# Patient Record
Sex: FEMALE | Race: WHITE | NOT HISPANIC OR LATINO
[De-identification: names, ages, dates, MRNs, and addresses within clinical notes are randomized per-mention and may not be internally consistent; named-entity substitution may affect disease eponyms.]

---

## 2017-01-12 ENCOUNTER — TRANSCRIPTION ENCOUNTER (OUTPATIENT)
Age: 77
End: 2017-01-12

## 2018-08-23 PROBLEM — Z00.00 ENCOUNTER FOR PREVENTIVE HEALTH EXAMINATION: Status: ACTIVE | Noted: 2018-08-23

## 2018-08-29 ENCOUNTER — APPOINTMENT (OUTPATIENT)
Dept: OPHTHALMOLOGY | Facility: CLINIC | Age: 78
End: 2018-08-29

## 2019-08-13 ENCOUNTER — APPOINTMENT (OUTPATIENT)
Dept: ORTHOPEDIC SURGERY | Facility: CLINIC | Age: 79
End: 2019-08-13

## 2019-09-04 ENCOUNTER — APPOINTMENT (OUTPATIENT)
Dept: ORTHOPEDIC SURGERY | Facility: CLINIC | Age: 79
End: 2019-09-04
Payer: COMMERCIAL

## 2019-09-04 VITALS — WEIGHT: 105 LBS | HEIGHT: 63 IN | BODY MASS INDEX: 18.61 KG/M2

## 2019-09-04 DIAGNOSIS — Z86.79 PERSONAL HISTORY OF OTHER DISEASES OF THE CIRCULATORY SYSTEM: ICD-10-CM

## 2019-09-04 DIAGNOSIS — M25.511 PAIN IN RIGHT SHOULDER: ICD-10-CM

## 2019-09-04 PROCEDURE — 73030 X-RAY EXAM OF SHOULDER: CPT | Mod: RT

## 2019-09-04 PROCEDURE — 20552 NJX 1/MLT TRIGGER POINT 1/2: CPT

## 2019-09-04 PROCEDURE — 99214 OFFICE O/P EST MOD 30 MIN: CPT | Mod: 25

## 2019-09-04 RX ORDER — LOSARTAN POTASSIUM 100 MG/1
TABLET, FILM COATED ORAL
Refills: 0 | Status: ACTIVE | COMMUNITY

## 2019-09-04 RX ORDER — TRANYLCYPROMINE SULFATE 10 MG/1
TABLET, FILM COATED ORAL
Refills: 0 | Status: ACTIVE | COMMUNITY

## 2019-09-04 NOTE — HISTORY OF PRESENT ILLNESS
[2] : an average pain level of 2/10 [de-identified] : EIGHT SHOULDER\par 1 MONTH\par PAIN LEVEL 2/10\par CONSTANT\par DULL PAIN\par BETTER WITH ADVIL, TIGER BALM\par WORSE WITH COMPUTER USE\par

## 2019-09-04 NOTE — PROCEDURE
[de-identified] : TRIGGER POINT INJECTIONS\par \par Patient has demonstrated limited relief from NSAIDS, rest, exercises / PT, and after discussion of the risks and benefits, the patient elected to proceed with a trigger point injection into the \par RIGHT LEVATOR \par \par  I confirmed no prior adverse reactions, no active infections, and no relevant allergies. \par The skin was prepped in the usual sterile manner. The site was injected with local anesthetic followed by local needling. \par The injection was completed without complication and a bandage was applied.\par  \par The patient tolerated the procedure well and was given post-injection instructions. Cold Tx x 48 hours, analgesics. prn \par Medications: 1.5cc of 1% xylocaine + 1.5cc .25% Bupivicaine + KENALOG 1MG  per site\par

## 2019-09-04 NOTE — REVIEW OF SYSTEMS
[Joint Pain] : joint pain [Decrease Hearing] : decrease hearing [Skin Lesions] : skin lesions [Dizziness] : dizziness [Anxiety] : anxiety [Depression] : depression [Negative] : Endocrine

## 2019-12-18 ENCOUNTER — APPOINTMENT (OUTPATIENT)
Dept: ORTHOPEDIC SURGERY | Facility: CLINIC | Age: 79
End: 2019-12-18
Payer: COMMERCIAL

## 2019-12-18 VITALS — HEIGHT: 63 IN | BODY MASS INDEX: 18.61 KG/M2 | WEIGHT: 105 LBS

## 2019-12-18 PROCEDURE — 20552 NJX 1/MLT TRIGGER POINT 1/2: CPT

## 2019-12-18 PROCEDURE — 99213 OFFICE O/P EST LOW 20 MIN: CPT | Mod: 25

## 2019-12-18 NOTE — PHYSICAL EXAM
[de-identified] : PHYSICAL EXAM  RIGHT  SHOULDER\par \par SCAPULAR PROTRACTION\par AROM 140 / 140 / 90 / 30\par TENDER: LEVATOR SCAPULA\par \par SPECIAL TESTING :\par WASHINGTON - NEGATIVE\par CHANTELL - NEGATIVE \par SPEED TEST - NEGATIVE\par \par SIOMN - NEGATIVE \par APPREHENSION AND SUPPRESSION - NEGATIVE \par \par RC STRENGTH TESTING \par SS:  5/5\par SUB 5/5\par IS     5/5\par BICEPS  5/5\par \par SENSATION  - GROSSLY INTACT\par \par \par

## 2019-12-18 NOTE — PROCEDURE
[de-identified] : TRIGGER POINT INJECTIONS\par \par Patient has demonstrated limited relief from NSAIDS, rest, exercises / PT, and after discussion of the risks and benefits, the patient elected to proceed with a trigger point injection into the \par \par RIGHT LEVATOR AND TRAPEZIUS\par \par  I confirmed no prior adverse reactions, no active infections, and no relevant allergies. \par The skin was prepped in the usual sterile manner. The site was injected with local anesthetic followed by local needling. \par The injection was completed without complication and a bandage was applied.\par  \par The patient tolerated the procedure well and was given post-injection instructions. Cold Tx x 48 hours, analgesics. prn \par Medications: 1.5cc of 1% xylocaine + 1.5cc .25% Bupivicaine + KENALOG 1MG  per site\par

## 2019-12-18 NOTE — ASSESSMENT
[FreeTextEntry1] : POST INJECTION INSTRUCTIONS\par \par COLD THERAPY , ANALGESICS PRN\par \par HOME STRETCHING AND EXERCISES QD\par \par

## 2019-12-18 NOTE — HISTORY OF PRESENT ILLNESS
[de-identified] : RIGHT SHOULDER\par INTERMITTENT PAIN\par SLIGHT IMPROVEMENT \par TRIGGER POINT INJECTION 9/4/19-HELPFUL\par

## 2020-01-28 ENCOUNTER — APPOINTMENT (OUTPATIENT)
Dept: ORTHOPEDIC SURGERY | Facility: CLINIC | Age: 80
End: 2020-01-28

## 2020-02-05 ENCOUNTER — APPOINTMENT (OUTPATIENT)
Dept: ORTHOPEDIC SURGERY | Facility: CLINIC | Age: 80
End: 2020-02-05
Payer: COMMERCIAL

## 2020-02-05 VITALS
OXYGEN SATURATION: 97 % | HEART RATE: 85 BPM | SYSTOLIC BLOOD PRESSURE: 135 MMHG | BODY MASS INDEX: 18.61 KG/M2 | HEIGHT: 63 IN | DIASTOLIC BLOOD PRESSURE: 84 MMHG | WEIGHT: 105 LBS

## 2020-02-05 DIAGNOSIS — Z87.2 PERSONAL HISTORY OF DISEASES OF THE SKIN AND SUBCUTANEOUS TISSUE: ICD-10-CM

## 2020-02-05 DIAGNOSIS — L40.50 ARTHROPATHIC PSORIASIS, UNSPECIFIED: ICD-10-CM

## 2020-02-05 DIAGNOSIS — Z78.9 OTHER SPECIFIED HEALTH STATUS: ICD-10-CM

## 2020-02-05 DIAGNOSIS — Z82.61 FAMILY HISTORY OF ARTHRITIS: ICD-10-CM

## 2020-02-05 PROCEDURE — 99214 OFFICE O/P EST MOD 30 MIN: CPT

## 2020-02-05 PROCEDURE — 72170 X-RAY EXAM OF PELVIS: CPT

## 2020-02-05 PROCEDURE — 73564 X-RAY EXAM KNEE 4 OR MORE: CPT | Mod: 50

## 2020-02-05 NOTE — PHYSICAL EXAM
[de-identified] : General appearance: well nourished and hydrated, pleasant, alert and oriented x 3, cooperative.  Thin body habitus.\par HEENT: ecchymoses along bilateral zygomatic arches. EOM intact, nasal septum midline, oral cavity clear, external auditory canal clear.  \par Cardiovascular: no lower leg edema, no varicosities, dorsalis pedis pulses palpable and symmetric.  \par Lymphatics: no palpable lymphadenopathy, no lymphedema.  \par Neurologic: sensation is normal, no muscle weakness in upper or lower extremities, patella tendon reflexes present and symmetric.  \par Dermatologic: skin moist, warm, no rash.  \par Spine: cervical spine with normal lordosis and painless range of motion, thoracic spine with normal kyphosis and painless range of motion, lumbosacral spine with normal lordosis and painless range of motion.\par Gait: normal.  \par \par Left knee:\par - Inspection: small effusion and moderate circumferential soft tissue swelling, anterior ecchymoses (black/blue), negative erythema.  \par - Wounds: none.  \par - Alignment: normal.  \par - Palpation: tenderness on palpation throughout ecchymotic area.  \par - ROM active: 0-140, anterior pain on extreme flexion\par - Ligamentous laxity: negative Lachman, negative ant. drawer test, negative post. drawer test, negative pivot shift test, stable to varus stress, stable to valgus stress.  \par - Popliteal angle: 60 degrees\par - Muscle Test: 5/5 quad strength.  \par \par Right knee:\par - Inspection: negative swelling, ecchymosis, and erythema.  \par - Wounds: none.  \par - Alignment: normal.  \par - Palpation: no specific tenderness on palpation.  Nontender along lateral distal femur.\par - ROM active: 0-140, no pain on extremes of motion\par - Ligamentous laxity: negative Lachman, negative ant. drawer test, negative post. drawer test, negative pivot shift test, stable to varus stress, stable to valgus stress.  \par - Popliteal angle: 60 degrees\par - Muscle Test: 5/5 quad strength. [de-identified] : AP pelvis and 4 views of the bilateral knees (weightbearing AP, weightbearing Meade, weightbearing lateral, and Sunrise) were obtained today and interpreted by me.\par \par Bilateral hips demonstrate normal alignment without arthritis (Tonnis 0). There is no proximal femoral or acetabular deformity. There is no fracture or prior fracture deformity. There is no radiographic evidence of osteonecrosis.\par \par Bilateral sacroiliac joints appear normal without arthrosis.\par \par The left knee demonstrates normal alignment in the coronal and sagittal planes. Chondrocalcinosis is present. There is tricompartmental arthritis with relatively symmetric appearance through the medial and lateral compartments, Kellgren-Cam 3. The patella sits at appropriate height and tracks centrally.\par \par The right knee demonstrates normal alignment in the coronal and sagittal planes. Chondrocalcinosis is present. There is tricompartmental arthritis with relatively symmetric appearance through the medial and lateral compartments, Kellgren-Cam 2-3. The patella sits at appropriate height and tracks centrally. There is a radiodense bony lesion present at the posterolateral aspect of the distal femoral metaphysis. Appears non-aggressive.

## 2020-02-05 NOTE — HISTORY OF PRESENT ILLNESS
[de-identified] : 78y/o female presenting for left knee pain. She reports that symptoms first arose after a fall in September 2019. She is not able to articulate why she fell, though she denies blacking out and also denies a mechanical trip/twist. She fell several additional times in the following months, most recently about 2 weeks ago. In this most recent episode she hit  both her left knee and her face on the sidewalk. She is seeing a neurologist and workup is still ongoing to see why this has been going on. She does admit to feeling a little off-balance lately. She is an avid runner and is obsessed with returning to sport. However, running is really the only activity that exacerbates her knee pain. Pain is minimal when only standing and walking. She denies right knee problems. She does endorse a history of psoriatic arthritis for which she takes Humira. [Improving] : improving [4] : a current pain level of 4/10 [Rest] : relieved by rest [de-identified] : dull/achy [de-identified] : running

## 2020-02-05 NOTE — DISCUSSION/SUMMARY
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 recent knee contusion. She describes the pain as fairly minimal with normal activity (i.e. non-running) so I feel that the risk of an acute tibial plateau fracture or severe osteochondral injury is fairly low. I advised her to continue with relative rest from high-impact activities for at least 6 weeks while the fresh contusion resolves.\par - The falls are very concerning. While the neurology workup is ongoing, I recommended that she use a cane for an additional point of balance.\par - PT for quad strengthening, gait training\par - Encouraged gentle HEP\par - OTC NSAIDs and Tylenol as needed for pain\par - RTC 6 weeks for re-evaluation. Consider CSI if not adequately improved.\par - Cont monitoring asymptomatic right distal femur lesion with XR q3mo. MRI if any new symptoms

## 2020-02-11 ENCOUNTER — APPOINTMENT (OUTPATIENT)
Dept: ORTHOPEDIC SURGERY | Facility: CLINIC | Age: 80
End: 2020-02-11

## 2021-07-27 DIAGNOSIS — Z86.79 PERSONAL HISTORY OF OTHER DISEASES OF THE CIRCULATORY SYSTEM: ICD-10-CM

## 2021-07-27 RX ORDER — CARVEDILOL 3.12 MG/1
3.12 TABLET, FILM COATED ORAL
Refills: 0 | Status: ACTIVE | COMMUNITY

## 2021-07-27 RX ORDER — ADALIMUMAB 20MG/0.4ML
KIT SUBCUTANEOUS
Refills: 0 | Status: ACTIVE | COMMUNITY

## 2021-08-02 ENCOUNTER — APPOINTMENT (OUTPATIENT)
Dept: CARDIOTHORACIC SURGERY | Facility: CLINIC | Age: 81
End: 2021-08-02
Payer: COMMERCIAL

## 2021-08-02 VITALS
DIASTOLIC BLOOD PRESSURE: 71 MMHG | HEART RATE: 74 BPM | TEMPERATURE: 97.4 F | OXYGEN SATURATION: 95 % | WEIGHT: 105 LBS | RESPIRATION RATE: 17 BRPM | BODY MASS INDEX: 18.61 KG/M2 | SYSTOLIC BLOOD PRESSURE: 149 MMHG | HEIGHT: 63 IN

## 2021-08-02 PROCEDURE — 99203 OFFICE O/P NEW LOW 30 MIN: CPT

## 2021-08-05 NOTE — HISTORY OF PRESENT ILLNESS
[FreeTextEntry1] : 81 year old female with a history of Psoriatic Arthritis (currently on Methotrexate), hypertension, moderate to severe aortic regurgitation who was referred for further evaluation. \par \par The patient states she is feeling well with no complaints. She denies any SOB at rest or with exertion, chest pain, palpitations, dizziness, syncope, or LE edema.  She is active and walks/run three miles a day. \par \par ECHO 7/8/21 reveals mild to moderate moderate mitral regurgitation and moderate to severe aortic regurgitation   \par \par \par She lives in an apartment with her . Does not require any assistance with ADLs.  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

## 2021-08-05 NOTE — PHYSICAL EXAM
[General Appearance - In No Acute Distress] : in no acute distress [Jugular Venous Distention Increased] : there was no jugular-venous distention [Respiration, Rhythm And Depth] : normal respiratory rhythm and effort [Exaggerated Use Of Accessory Muscles For Inspiration] : no accessory muscle use [Heart Rate And Rhythm] : heart rate was normal and rhythm regular [Heart Sounds] : normal S1 and S2 [Bowel Sounds] : normal bowel sounds [Abdomen Soft] : soft [Abdomen Tenderness] : non-tender [No CVA Tenderness] : no ~M costovertebral angle tenderness [Abnormal Walk] : normal gait [] : no rash [Oriented To Time, Place, And Person] : oriented to person, place, and time [FreeTextEntry1] : + systolic ejection murmur

## 2021-11-15 ENCOUNTER — APPOINTMENT (OUTPATIENT)
Dept: ORTHOPEDIC SURGERY | Facility: CLINIC | Age: 81
End: 2021-11-15
Payer: COMMERCIAL

## 2021-11-15 DIAGNOSIS — G25.89 OTHER SPECIFIED EXTRAPYRAMIDAL AND MOVEMENT DISORDERS: ICD-10-CM

## 2021-11-15 DIAGNOSIS — S46.002A UNSPECIFIED INJURY OF MUSCLE(S) AND TENDON(S) OF THE ROTATOR CUFF OF LEFT SHOULDER, INITIAL ENCOUNTER: ICD-10-CM

## 2021-11-15 DIAGNOSIS — M75.42 IMPINGEMENT SYNDROME OF LEFT SHOULDER: ICD-10-CM

## 2021-11-15 PROCEDURE — 73030 X-RAY EXAM OF SHOULDER: CPT | Mod: LT

## 2021-11-15 PROCEDURE — 76882 US LMTD JT/FCL EVL NVASC XTR: CPT | Mod: LT,59

## 2021-11-15 PROCEDURE — 99214 OFFICE O/P EST MOD 30 MIN: CPT | Mod: 25

## 2021-11-15 NOTE — DISCUSSION/SUMMARY
[de-identified] : ULTRASOUND EVALUATION  REVEALS INFLAMMATORY CHANGES WITHOUT SIGNIFICANT TEAR \par PATIENT HAS ELECTED TO PROCEED WITH KENALOG INJECTION SHOULDER \par RISKS AND BENEFITS DISCUSSED - VERBAL CONSENT OBTAINED \par \par \par POST INJECTION INSTRUCTIONS\par \par COLD THERAPY , ANALGESICS PRN\par \par AVOID OVERHEAD WEIGHTS 6 WEIGHT \par \par HOME STRETCHING AND EXERCISES QD  HANDOUT PROVIDED, REVIEWED AND DEMONSTRATED \par \par START P.T.  2 WEEKS AFTER INJECTION - 2 X 4 WEEKS \par \par MRI IF NO RELIEF\par

## 2021-11-15 NOTE — PHYSICAL EXAM
[de-identified] : PHYSICAL EXAM LEFT  SHOULDER\par \par MARKIT  SCAPULAR PROTRACTION\par AROM 125 / 100 / 60 / 10 \par TENDER: SA REGION ANTERIOR AND LATERAL \par \par SPECIAL TESTING :\par [place] - POSITIVE \par CHANTELL - POSITIVE \par SPEED TEST - POSITIVE\par \par SIMON - NEGATIVE \par APPREHENSION AND SUPPRESSION - NEGATIVE \par \par RC STRENGTH TESTING \par SS:  5/5\par SUB 5/5\par IS     5/5\par BICEPS  5/5\par \par SENSATION  - GROSSLY INTACT\par \par \par  [de-identified] : LEFT SHOULDER XRAY (2 VIEWS - AP AND OUTLET) -  \par NO OBVIOUS FRACTURE , SEPARATION OR DISLOCATION \par NO SIGNIFICANT OSTEOARTHRITIS, \par TYPE 1B ACROMION \par CSA= 38\par

## 2021-11-15 NOTE — ASSESSMENT
[FreeTextEntry1] : EVALUATION AND MANAGEMENT \par \par 1- IMPINGEMENT SYNDROME  - NARROW SS OUTLET ON XRAY CONSISTENT WITH IMPINGEMENT SYNDROME \par PLAN -  HOME EXERCISES DEMONSTRATED AND PROVIDED, \par PROGRESS TO P.T. 2 X 4 WEEKS FOR SCAPULAR POSTURE, FLEXIBILITY AND REHAB \par \par 2 - ROTATOR CUFF TENDONITIS - TENDONITIS, BURSITIS, NO COMPLETE TEAR SEEN ON ULTRASOUND EVALUATION \par PLAN - KENALOG INJECTION  ( SEE PROCEDURE NOTE ) \par \par \par

## 2021-11-15 NOTE — PROCEDURE
[de-identified] : DIAGNOSTIC ULTRASOUND LEFT SHOULDER\par \par DIAGNOSTIC SONOGRAPHY of the Rotator Cuff Soft Tissue of the LEFT  SHOULDER was performed in Multiple Scan Planes with varying transducer frequencies.\par Imaging of the Supraspinatus Tendon reveals TENDONITIS, BURSITIS, ARTICULAR SIDE DEGENERATION  WITH OUT SIGNIFICANT / COMPLETE TEAR\par Imaging of the Biceps Tendon reveals no significant tear.\par Imaging of the Subscapularis Tendon reveals no significant tear.\par Imaging of the Infraspinatus Tendon reveals no significant tear.\par Key images were save digitally and reviewed with patient.\par \par \par INJECTION LEFT SHOULDER SA SPACE\par \par Patient has demonstrated limited relief from NSAIDS, rest, exercises / PT, and after discussion of the risks and benefits, the patient has elected to proceed with an ULTRASOUND GUIDED injection into the LEFT SUBACROMIAL  SPACE LATERAL APPROACH \par  \par Confirmed that the patient does not have history of prior adverse reactions, active, infections, or relevant allergies. There was no effusion, erythema, or warmth, and the skin was clear\par \par The skin was sterilized with alcohol. Ethyl Chloride was used as a topical anesthetic. Routine sterile technique. \par The site was injected UTILIZING ULTRASOUND GUIDANCE to confirm appropriate placement of the needle-\par with a mixture of medication and local anesthetic. The injection was completed without complication and a bandage was applied.\par  \par The patient tolerated the procedure well and was given post-injection instructions.Rec: Cold therapy, analgesics, avoid heavy activity.\par MEDICATION: 4cc of 1% xylocaine + 10mg of KENALOG\par \par

## 2021-11-15 NOTE — HISTORY OF PRESENT ILLNESS
[de-identified] : LEFT SHOULDER CHRONIC PAIN \par PAIN STARTED OCTOBER 2021-- PT WAS LIFTING 4-5 LB WEIGHTS DOING OVER HEAD LIFTING EXERCISES \par INTERMITTENT \par PAIN LEVEL: 0,10, 7/10 WITH MOVEMENT \par BETTER WITH IBUPROFEN, BENGAY\par WORSE WHEN REACHING BEHIND, LIFTING

## 2022-04-26 ENCOUNTER — TRANSCRIPTION ENCOUNTER (OUTPATIENT)
Age: 82
End: 2022-04-26

## 2022-12-19 ENCOUNTER — NON-APPOINTMENT (OUTPATIENT)
Age: 82
End: 2022-12-19

## 2023-03-13 ENCOUNTER — APPOINTMENT (OUTPATIENT)
Dept: PLASTIC SURGERY | Facility: CLINIC | Age: 83
End: 2023-03-13

## 2023-05-02 ENCOUNTER — NON-APPOINTMENT (OUTPATIENT)
Age: 83
End: 2023-05-02

## 2023-09-14 ENCOUNTER — NON-APPOINTMENT (OUTPATIENT)
Age: 83
End: 2023-09-14

## 2023-09-18 ENCOUNTER — APPOINTMENT (OUTPATIENT)
Dept: ORTHOPEDIC SURGERY | Facility: CLINIC | Age: 83
End: 2023-09-18
Payer: MEDICARE

## 2023-09-18 DIAGNOSIS — M25.511 PAIN IN RIGHT SHOULDER: ICD-10-CM

## 2023-09-18 PROCEDURE — 20553 NJX 1/MLT TRIGGER POINTS 3/>: CPT

## 2023-09-18 PROCEDURE — 99213 OFFICE O/P EST LOW 20 MIN: CPT | Mod: 25

## 2023-12-23 ENCOUNTER — INPATIENT (INPATIENT)
Facility: HOSPITAL | Age: 83
LOS: 3 days | Discharge: ANOTHER HOSPITAL | DRG: 862 | End: 2023-12-27
Attending: INTERNAL MEDICINE | Admitting: STUDENT IN AN ORGANIZED HEALTH CARE EDUCATION/TRAINING PROGRAM
Payer: MEDICARE

## 2023-12-23 VITALS
SYSTOLIC BLOOD PRESSURE: 144 MMHG | TEMPERATURE: 98 F | OXYGEN SATURATION: 98 % | DIASTOLIC BLOOD PRESSURE: 63 MMHG | HEART RATE: 91 BPM | WEIGHT: 104.94 LBS | HEIGHT: 62 IN | RESPIRATION RATE: 20 BRPM

## 2023-12-23 DIAGNOSIS — E87.1 HYPO-OSMOLALITY AND HYPONATREMIA: ICD-10-CM

## 2023-12-23 DIAGNOSIS — L03.90 CELLULITIS, UNSPECIFIED: ICD-10-CM

## 2023-12-23 DIAGNOSIS — M00.9 PYOGENIC ARTHRITIS, UNSPECIFIED: ICD-10-CM

## 2023-12-23 DIAGNOSIS — U07.1 COVID-19: ICD-10-CM

## 2023-12-23 DIAGNOSIS — Z29.9 ENCOUNTER FOR PROPHYLACTIC MEASURES, UNSPECIFIED: ICD-10-CM

## 2023-12-23 DIAGNOSIS — F32.9 MAJOR DEPRESSIVE DISORDER, SINGLE EPISODE, UNSPECIFIED: ICD-10-CM

## 2023-12-23 DIAGNOSIS — S42.402A UNSPECIFIED FRACTURE OF LOWER END OF LEFT HUMERUS, INITIAL ENCOUNTER FOR CLOSED FRACTURE: Chronic | ICD-10-CM

## 2023-12-23 DIAGNOSIS — D64.9 ANEMIA, UNSPECIFIED: ICD-10-CM

## 2023-12-23 DIAGNOSIS — I10 ESSENTIAL (PRIMARY) HYPERTENSION: ICD-10-CM

## 2023-12-23 LAB
ANION GAP SERPL CALC-SCNC: 7 MMOL/L — SIGNIFICANT CHANGE UP (ref 5–17)
ANION GAP SERPL CALC-SCNC: 7 MMOL/L — SIGNIFICANT CHANGE UP (ref 5–17)
BASOPHILS # BLD AUTO: 0.01 K/UL — SIGNIFICANT CHANGE UP (ref 0–0.2)
BASOPHILS # BLD AUTO: 0.01 K/UL — SIGNIFICANT CHANGE UP (ref 0–0.2)
BASOPHILS NFR BLD AUTO: 0.1 % — SIGNIFICANT CHANGE UP (ref 0–2)
BASOPHILS NFR BLD AUTO: 0.1 % — SIGNIFICANT CHANGE UP (ref 0–2)
BUN SERPL-MCNC: 19 MG/DL — SIGNIFICANT CHANGE UP (ref 7–23)
BUN SERPL-MCNC: 19 MG/DL — SIGNIFICANT CHANGE UP (ref 7–23)
CALCIUM SERPL-MCNC: 9.3 MG/DL — SIGNIFICANT CHANGE UP (ref 8.4–10.5)
CALCIUM SERPL-MCNC: 9.3 MG/DL — SIGNIFICANT CHANGE UP (ref 8.4–10.5)
CHLORIDE SERPL-SCNC: 95 MMOL/L — LOW (ref 96–108)
CHLORIDE SERPL-SCNC: 95 MMOL/L — LOW (ref 96–108)
CO2 SERPL-SCNC: 29 MMOL/L — SIGNIFICANT CHANGE UP (ref 22–31)
CO2 SERPL-SCNC: 29 MMOL/L — SIGNIFICANT CHANGE UP (ref 22–31)
CREAT SERPL-MCNC: 0.51 MG/DL — SIGNIFICANT CHANGE UP (ref 0.5–1.3)
CREAT SERPL-MCNC: 0.51 MG/DL — SIGNIFICANT CHANGE UP (ref 0.5–1.3)
CRP SERPL-MCNC: 116.2 MG/L — HIGH (ref 0–4)
CRP SERPL-MCNC: 116.2 MG/L — HIGH (ref 0–4)
EGFR: 93 ML/MIN/1.73M2 — SIGNIFICANT CHANGE UP
EGFR: 93 ML/MIN/1.73M2 — SIGNIFICANT CHANGE UP
EOSINOPHIL # BLD AUTO: 0.21 K/UL — SIGNIFICANT CHANGE UP (ref 0–0.5)
EOSINOPHIL # BLD AUTO: 0.21 K/UL — SIGNIFICANT CHANGE UP (ref 0–0.5)
EOSINOPHIL NFR BLD AUTO: 3 % — SIGNIFICANT CHANGE UP (ref 0–6)
EOSINOPHIL NFR BLD AUTO: 3 % — SIGNIFICANT CHANGE UP (ref 0–6)
ERYTHROCYTE [SEDIMENTATION RATE] IN BLOOD: 114 MM/HR — HIGH
ERYTHROCYTE [SEDIMENTATION RATE] IN BLOOD: 114 MM/HR — HIGH
GLUCOSE SERPL-MCNC: 122 MG/DL — HIGH (ref 70–99)
GLUCOSE SERPL-MCNC: 122 MG/DL — HIGH (ref 70–99)
HCT VFR BLD CALC: 28.4 % — LOW (ref 34.5–45)
HCT VFR BLD CALC: 28.4 % — LOW (ref 34.5–45)
HGB BLD-MCNC: 9.1 G/DL — LOW (ref 11.5–15.5)
HGB BLD-MCNC: 9.1 G/DL — LOW (ref 11.5–15.5)
IMM GRANULOCYTES NFR BLD AUTO: 0.4 % — SIGNIFICANT CHANGE UP (ref 0–0.9)
IMM GRANULOCYTES NFR BLD AUTO: 0.4 % — SIGNIFICANT CHANGE UP (ref 0–0.9)
LYMPHOCYTES # BLD AUTO: 1.09 K/UL — SIGNIFICANT CHANGE UP (ref 1–3.3)
LYMPHOCYTES # BLD AUTO: 1.09 K/UL — SIGNIFICANT CHANGE UP (ref 1–3.3)
LYMPHOCYTES # BLD AUTO: 15.4 % — SIGNIFICANT CHANGE UP (ref 13–44)
LYMPHOCYTES # BLD AUTO: 15.4 % — SIGNIFICANT CHANGE UP (ref 13–44)
MCHC RBC-ENTMCNC: 29.7 PG — SIGNIFICANT CHANGE UP (ref 27–34)
MCHC RBC-ENTMCNC: 29.7 PG — SIGNIFICANT CHANGE UP (ref 27–34)
MCHC RBC-ENTMCNC: 32 GM/DL — SIGNIFICANT CHANGE UP (ref 32–36)
MCHC RBC-ENTMCNC: 32 GM/DL — SIGNIFICANT CHANGE UP (ref 32–36)
MCV RBC AUTO: 92.8 FL — SIGNIFICANT CHANGE UP (ref 80–100)
MCV RBC AUTO: 92.8 FL — SIGNIFICANT CHANGE UP (ref 80–100)
MONOCYTES # BLD AUTO: 0.65 K/UL — SIGNIFICANT CHANGE UP (ref 0–0.9)
MONOCYTES # BLD AUTO: 0.65 K/UL — SIGNIFICANT CHANGE UP (ref 0–0.9)
MONOCYTES NFR BLD AUTO: 9.2 % — SIGNIFICANT CHANGE UP (ref 2–14)
MONOCYTES NFR BLD AUTO: 9.2 % — SIGNIFICANT CHANGE UP (ref 2–14)
NEUTROPHILS # BLD AUTO: 5.09 K/UL — SIGNIFICANT CHANGE UP (ref 1.8–7.4)
NEUTROPHILS # BLD AUTO: 5.09 K/UL — SIGNIFICANT CHANGE UP (ref 1.8–7.4)
NEUTROPHILS NFR BLD AUTO: 71.9 % — SIGNIFICANT CHANGE UP (ref 43–77)
NEUTROPHILS NFR BLD AUTO: 71.9 % — SIGNIFICANT CHANGE UP (ref 43–77)
NRBC # BLD: 0 /100 WBCS — SIGNIFICANT CHANGE UP (ref 0–0)
NRBC # BLD: 0 /100 WBCS — SIGNIFICANT CHANGE UP (ref 0–0)
PLATELET # BLD AUTO: 303 K/UL — SIGNIFICANT CHANGE UP (ref 150–400)
PLATELET # BLD AUTO: 303 K/UL — SIGNIFICANT CHANGE UP (ref 150–400)
POTASSIUM SERPL-MCNC: 4.7 MMOL/L — SIGNIFICANT CHANGE UP (ref 3.5–5.3)
POTASSIUM SERPL-MCNC: 4.7 MMOL/L — SIGNIFICANT CHANGE UP (ref 3.5–5.3)
POTASSIUM SERPL-SCNC: 4.7 MMOL/L — SIGNIFICANT CHANGE UP (ref 3.5–5.3)
POTASSIUM SERPL-SCNC: 4.7 MMOL/L — SIGNIFICANT CHANGE UP (ref 3.5–5.3)
RBC # BLD: 3.06 M/UL — LOW (ref 3.8–5.2)
RBC # BLD: 3.06 M/UL — LOW (ref 3.8–5.2)
RBC # FLD: 14.6 % — HIGH (ref 10.3–14.5)
RBC # FLD: 14.6 % — HIGH (ref 10.3–14.5)
SARS-COV-2 RNA SPEC QL NAA+PROBE: DETECTED
SARS-COV-2 RNA SPEC QL NAA+PROBE: DETECTED
SODIUM SERPL-SCNC: 131 MMOL/L — LOW (ref 135–145)
SODIUM SERPL-SCNC: 131 MMOL/L — LOW (ref 135–145)
WBC # BLD: 7.08 K/UL — SIGNIFICANT CHANGE UP (ref 3.8–10.5)
WBC # BLD: 7.08 K/UL — SIGNIFICANT CHANGE UP (ref 3.8–10.5)
WBC # FLD AUTO: 7.08 K/UL — SIGNIFICANT CHANGE UP (ref 3.8–10.5)
WBC # FLD AUTO: 7.08 K/UL — SIGNIFICANT CHANGE UP (ref 3.8–10.5)

## 2023-12-23 PROCEDURE — 73070 X-RAY EXAM OF ELBOW: CPT | Mod: 26,LT

## 2023-12-23 PROCEDURE — 99285 EMERGENCY DEPT VISIT HI MDM: CPT

## 2023-12-23 PROCEDURE — 99223 1ST HOSP IP/OBS HIGH 75: CPT | Mod: GC

## 2023-12-23 RX ORDER — LANOLIN ALCOHOL/MO/W.PET/CERES
3 CREAM (GRAM) TOPICAL AT BEDTIME
Refills: 0 | Status: DISCONTINUED | OUTPATIENT
Start: 2023-12-23 | End: 2023-12-27

## 2023-12-23 RX ORDER — ENOXAPARIN SODIUM 100 MG/ML
30 INJECTION SUBCUTANEOUS EVERY 24 HOURS
Refills: 0 | Status: DISCONTINUED | OUTPATIENT
Start: 2023-12-23 | End: 2023-12-27

## 2023-12-23 RX ORDER — CEFTRIAXONE 500 MG/1
1000 INJECTION, POWDER, FOR SOLUTION INTRAMUSCULAR; INTRAVENOUS EVERY 24 HOURS
Refills: 0 | Status: DISCONTINUED | OUTPATIENT
Start: 2023-12-23 | End: 2023-12-26

## 2023-12-23 RX ORDER — LOSARTAN POTASSIUM 100 MG/1
25 TABLET, FILM COATED ORAL DAILY
Refills: 0 | Status: DISCONTINUED | OUTPATIENT
Start: 2023-12-23 | End: 2023-12-27

## 2023-12-23 RX ORDER — VANCOMYCIN HCL 1 G
750 VIAL (EA) INTRAVENOUS ONCE
Refills: 0 | Status: DISCONTINUED | OUTPATIENT
Start: 2023-12-24 | End: 2023-12-24

## 2023-12-23 RX ORDER — ENOXAPARIN SODIUM 100 MG/ML
40 INJECTION SUBCUTANEOUS EVERY 24 HOURS
Refills: 0 | Status: DISCONTINUED | OUTPATIENT
Start: 2023-12-23 | End: 2023-12-23

## 2023-12-23 RX ORDER — VANCOMYCIN HCL 1 G
1000 VIAL (EA) INTRAVENOUS ONCE
Refills: 0 | Status: COMPLETED | OUTPATIENT
Start: 2023-12-23 | End: 2023-12-23

## 2023-12-23 RX ORDER — ACETAMINOPHEN 500 MG
650 TABLET ORAL EVERY 6 HOURS
Refills: 0 | Status: DISCONTINUED | OUTPATIENT
Start: 2023-12-23 | End: 2023-12-27

## 2023-12-23 RX ADMIN — ENOXAPARIN SODIUM 30 MILLIGRAM(S): 100 INJECTION SUBCUTANEOUS at 18:31

## 2023-12-23 RX ADMIN — CEFTRIAXONE 100 MILLIGRAM(S): 500 INJECTION, POWDER, FOR SOLUTION INTRAMUSCULAR; INTRAVENOUS at 19:41

## 2023-12-23 RX ADMIN — Medication 1000 MILLIGRAM(S): at 14:53

## 2023-12-23 RX ADMIN — Medication 250 MILLIGRAM(S): at 13:53

## 2023-12-23 NOTE — ED PROVIDER NOTE - NS ED ROS FT
REVIEW OF SYSTEMS  positive for: rash   negative for: fever, headache, eye pain, runny nose, sore throat, cough, shortness of breath, chest pain, stomach pain, vomiting, diarrhea, dysuria, myalgias,

## 2023-12-23 NOTE — H&P ADULT - PROBLEM SELECTOR PLAN 7
Home med [id number]     - med rec am Home med [id number]     - med rec am Home med [id number] five times a day    - unavailable in patient pharmacy, will ask patient family member to bring in Home med [id number] five times a day    - unavailable in patient pharmacy, will ask patient family member to bring in

## 2023-12-23 NOTE — H&P ADULT - ASSESSMENT
83 year old female with a past medical history significant for HTN and reactive arthritis, left elbow fracture s/p ORIF (Dr. Daly) on 12/7/23 who presented with pain of the left elbow for the past month and admitted for cellulitis. 83 year old female with a past medical history significant for HTN and psoriatic arthritis, left elbow fracture s/p ORIF (Dr. Daly) on 12/7/23 who presented with pain of the left elbow for the past month and admitted for cellulitis.

## 2023-12-23 NOTE — H&P ADULT - NSHPLABSRESULTS_GEN_ALL_CORE
LABS:                         9.1    7.08  )-----------( 303      ( 23 Dec 2023 12:14 )             28.4     12-23    131<L>  |  95<L>  |  19  ----------------------------<  122<H>  4.7   |  29  |  0.51    Ca    9.3      23 Dec 2023 12:14        Urinalysis Basic - ( 23 Dec 2023 12:14 )    Color: x / Appearance: x / SG: x / pH: x  Gluc: 122 mg/dL / Ketone: x  / Bili: x / Urobili: x   Blood: x / Protein: x / Nitrite: x   Leuk Esterase: x / RBC: x / WBC x   Sq Epi: x / Non Sq Epi: x / Bacteria: x                RADIOLOGY, EKG & ADDITIONAL TESTS:

## 2023-12-23 NOTE — CONSULT NOTE ADULT - SUBJECTIVE AND OBJECTIVE BOX
Orthopaedic Surgery Consult Note    For Surgeon:    HPI:    83 year old female covid +  w/ left elbow fracture  ORIF (Dr. Daly) on 12/7/23 at [facility] w/ ssi for "several weeks"   Per patient completed  5 day course of Keflex, however the pain and redness continued.     Reports she was adivsed by her surgeons for two more days of Keflex from 12/21/23 to today but came into the ED since symptoms have not improve  Denies trauma or injury. Without any new weakness/n/t/p. No other MSK complaints.          PMHx -  HTN and psoriatic arthritis, depression,  PSx -  Left elbow ORIF  Social (-)Tobacco (-)EtOh (-)Elicit substance       Vital Signs Last 24 Hrs  T(C): 37.4 (23 Dec 2023 18:34), Max: 37.4 (23 Dec 2023 18:34)  T(F): 99.4 (23 Dec 2023 18:34), Max: 99.4 (23 Dec 2023 18:34)  HR: 73 (23 Dec 2023 18:34) (73 - 91)  BP: 127/74 (23 Dec 2023 18:34) (127/74 - 144/63)  BP(mean): --  RR: 16 (23 Dec 2023 18:34) (16 - 20)  SpO2: 97% (23 Dec 2023 18:34) (97% - 98%)    Parameters below as of 23 Dec 2023 18:34  Patient On (Oxygen Delivery Method): room air        Physical Exam:  AVSS     Gen: NAD, AOx3     Left?Upper Extremity?   Skin intact?no open injuries   +ttp, swelling, erythema over the?L??elbow  No drainage  Painless ROM of all other extremities. No shoulder, wrist, hand pain on injured side.    AIN/PIN/U/Med/R ?intact to motor   SILT M/R/U/Ax   Palpable radial and ulnar pulses   Brisk cap refill distally   Compartments soft and compressible                           9.1    7.08  )-----------( 303      ( 23 Dec 2023 12:14 )             28.4     12-23    131<L>  |  95<L>  |  19  ----------------------------<  122<H>  4.7   |  29  |  0.51    Ca    9.3      23 Dec 2023 12:14        Imaging: Large elbow joint with soft tissue swelling about the elbow. Findings are of an uncertain etiology. A discrete fracture line is not identified..    A/P: 83yFemale w/ L elbow redness swelling s/p ORIF L elbow fracture  -Low suspicion for septic joint arthritis at this time  Recommend abx  -Follow up with index surgeon.   -Will monitor for improvement  while inhouse    -Discussed with Dr. Way    Ortho Pager [phone]   Orthopaedic Surgery Consult Note    For Surgeon:    HPI:    83 year old female covid +  w/ left elbow fracture  ORIF (Dr. Daly) on 12/7/[facility] for "several weeks"   Per patient completed  5 day course of Keflex, however the pain and redness continued.     Reports she was adivsed by her surgeons for two more days of Keflex from 12/21/23 to today but came into the ED since symptoms have not improve  Denies trauma or injury. Without any new weakness/n/t/p. No other MSK complaints.          PMHx -  HTN and psoriatic arthritis, depression,  PSx -  Left elbow ORIF  Social (-)Tobacco (-)EtOh (-)Elicit substance       Vital Signs Last 24 Hrs  T(C): 37.4 (23 Dec 2023 18:34), Max: 37.4 (23 Dec 2023 18:34)  T(F): 99.4 (23 Dec 2023 18:34), Max: 99.4 (23 Dec 2023 18:34)  HR: 73 (23 Dec 2023 18:34) (73 - 91)  BP: 127/74 (23 Dec 2023 18:34) (127/74 - 144/63)  BP(mean): --  RR: 16 (23 Dec 2023 18:34) (16 - 20)  SpO2: 97% (23 Dec 2023 18:34) (97% - 98%)    Parameters below as of 23 Dec 2023 18:34  Patient On (Oxygen Delivery Method): room air        Physical Exam:  AVSS     Gen: NAD, AOx3     Left?Upper Extremity?   Skin intact?no open injuries   +ttp, swelling, erythema over the?L??elbow  No drainage  Painless ROM of all other extremities. No shoulder, wrist, hand pain on injured side.    AIN/PIN/U/Med/R ?intact to motor   SILT M/R/U/Ax   Palpable radial and ulnar pulses   Brisk cap refill distally   Compartments soft and compressible                           9.1    7.08  )-----------( 303      ( 23 Dec 2023 12:14 )             28.4     12-23    131<L>  |  95<L>  |  19  ----------------------------<  122<H>  4.7   |  29  |  0.51    Ca    9.3      23 Dec 2023 12:14        Imaging: Large elbow joint with soft tissue swelling about the elbow. Findings are of an uncertain etiology. A discrete fracture line is not identified..    A/P: 83yFemale w/ L elbow redness swelling s/p ORIF L elbow fracture  -Low suspicion for septic joint arthritis at this time  Recommend abx  -Follow up with index surgeon.   -Will monitor for improvement  while inhouse    -Discussed with Dr. Way    Ortho Pager [phone]   Orthopaedic Surgery Consult Note    For Surgeon:    HPI:    83 year old female covid +  w/ left elbow fracture  ORIF (Dr. Daly) on 12/7/23 at [facility] w/ ssi for "several weeks"   Per patient completed  5 day course of Keflex, however the pain and redness continued.     Reports she was adivsed by her surgeons for two more days of Keflex from 12/21/23 to today but came into the ED since symptoms have not improve  Denies trauma or injury. Without any new weakness/n/t/p. No other MSK complaints.          PMHx -  HTN and psoriatic arthritis, depression,  PSx -  Left elbow ORIF  Social (-)Tobacco (-)EtOh (-)Elicit substance       Vital Signs Last 24 Hrs  T(C): 37.4 (23 Dec 2023 18:34), Max: 37.4 (23 Dec 2023 18:34)  T(F): 99.4 (23 Dec 2023 18:34), Max: 99.4 (23 Dec 2023 18:34)  HR: 73 (23 Dec 2023 18:34) (73 - 91)  BP: 127/74 (23 Dec 2023 18:34) (127/74 - 144/63)  BP(mean): --  RR: 16 (23 Dec 2023 18:34) (16 - 20)  SpO2: 97% (23 Dec 2023 18:34) (97% - 98%)    Parameters below as of 23 Dec 2023 18:34  Patient On (Oxygen Delivery Method): room air        Physical Exam:  AVSS     Gen: NAD, AOx3     Left?Upper Extremity?   Skin intact?no open injuries   +ttp, swelling, erythema over the?L??elbow  No drainage  Painless ROM of all other extremities. No shoulder, wrist, hand pain on injured side.    AIN/PIN/U/Med/R ?intact to motor   SILT M/R/U/Ax   Palpable radial and ulnar pulses   Brisk cap refill distally   Compartments soft and compressible                           9.1    7.08  )-----------( 303      ( 23 Dec 2023 12:14 )             28.4     12-23    131<L>  |  95<L>  |  19  ----------------------------<  122<H>  4.7   |  29  |  0.51    Ca    9.3      23 Dec 2023 12:14        Imaging: Large elbow joint with soft tissue swelling about the elbow. Findings are of an uncertain etiology. A discrete fracture line is not identified..    A/P: 83yFemale w/ L elbow redness swelling s/p ORIF L elbow fracture  -Low suspicion for septic joint arthritis at this time  Recommend abx  -Follow up with index surgeon.   -Please page with any questions or concerns    -Discussed with Dr. Way    Ortho Pager [phone]   Orthopaedic Surgery Consult Note    For Surgeon:    HPI:    83 year old female covid +  w/ left elbow fracture  ORIF (Dr. Daly) on 12/7/23 at [facility] w/ ssi for "several weeks"   Per patient completed  5 day course of Keflex, however the pain and redness continued.     Reports she was adivsed by her surgeons for two more days of Keflex from 12/21/23 to today but came into the ED since symptoms have not improve  Denies trauma or injury. Without any new weakness/n/t/p. No other MSK complaints.          PMHx -  HTN and psoriatic arthritis, depression,  PSx -  Left elbow ORIF  Social (-)Tobacco (-)EtOh (-)Elicit substance       Vital Signs Last 24 Hrs  T(C): 37.4 (23 Dec 2023 18:34), Max: 37.4 (23 Dec 2023 18:34)  T(F): 99.4 (23 Dec 2023 18:34), Max: 99.4 (23 Dec 2023 18:34)  HR: 73 (23 Dec 2023 18:34) (73 - 91)  BP: 127/74 (23 Dec 2023 18:34) (127/74 - 144/63)  BP(mean): --  RR: 16 (23 Dec 2023 18:34) (16 - 20)  SpO2: 97% (23 Dec 2023 18:34) (97% - 98%)    Parameters below as of 23 Dec 2023 18:34  Patient On (Oxygen Delivery Method): room air        Physical Exam:  AVSS     Gen: NAD, AOx3     Left?Upper Extremity?   Skin intact?no open injuries   +ttp, swelling, erythema over the?L??elbow  No drainage  Painless ROM of all other extremities. No shoulder, wrist, hand pain on injured side.    AIN/PIN/U/Med/R ?intact to motor   SILT M/R/U/Ax   Palpable radial and ulnar pulses   Brisk cap refill distally   Compartments soft and compressible                           9.1    7.08  )-----------( 303      ( 23 Dec 2023 12:14 )             28.4     12-23    131<L>  |  95<L>  |  19  ----------------------------<  122<H>  4.7   |  29  |  0.51    Ca    9.3      23 Dec 2023 12:14        Imaging: Large elbow joint with soft tissue swelling about the elbow. Findings are of an uncertain etiology. A discrete fracture line is not identified..    A/P: 83yFemale w/ L elbow redness swelling s/p ORIF L elbow fracture  -Low suspicion for septic joint arthritis at this time  Recommend abx  -Follow up with index surgeon.   -Please page with any questions or concerns    -Discussed with Dr. Way    Ortho Pager [phone]   Orthopaedic Surgery Consult Note    For Surgeon:    HPI:    83 year old female covid +  w/ left elbow fracture  ORIF (Dr. Daly) on 12/7/23 at [facility] w/ ssi for "several weeks"   Per patient completed  5 day course of Keflex, however the pain and redness continued.     Reports she was adivsed by her surgeons for two more days of Keflex from 12/21/23 to today but came into the ED since symptoms have not improve  Denies trauma or injury. Without any new weakness/n/t/p. No other MSK complaints.          PMHx -  HTN and psoriatic arthritis, depression,  PSx -  Left elbow ORIF  Social (-)Tobacco (-)EtOh (-)Elicit substance       Vital Signs Last 24 Hrs  T(C): 37.4 (23 Dec 2023 18:34), Max: 37.4 (23 Dec 2023 18:34)  T(F): 99.4 (23 Dec 2023 18:34), Max: 99.4 (23 Dec 2023 18:34)  HR: 73 (23 Dec 2023 18:34) (73 - 91)  BP: 127/74 (23 Dec 2023 18:34) (127/74 - 144/63)  BP(mean): --  RR: 16 (23 Dec 2023 18:34) (16 - 20)  SpO2: 97% (23 Dec 2023 18:34) (97% - 98%)    Parameters below as of 23 Dec 2023 18:34  Patient On (Oxygen Delivery Method): room air        Physical Exam:  AVSS     Gen: NAD, AOx3     Left?Upper Extremity?   Skin intact?no open injuries   +ttp, swelling, erythema over the?L??elbow  No drainage  Painless ROM of all other extremities. No shoulder, wrist, hand pain on injured side.    AIN/PIN/U/Med/R ?intact to motor   SILT M/R/U/Ax   Palpable radial and ulnar pulses   Brisk cap refill distally   Compartments soft and compressible                           9.1    7.08  )-----------( 303      ( 23 Dec 2023 12:14 )             28.4     12-23    131<L>  |  95<L>  |  19  ----------------------------<  122<H>  4.7   |  29  |  0.51    Ca    9.3      23 Dec 2023 12:14        Imaging: Large elbow joint with soft tissue swelling about the elbow. Findings are of an uncertain etiology. A discrete fracture line is not identified..    A/P: 83yFemale w/ L elbow redness swelling s/p ORIF L elbow fracture  -Low suspicion for septic joint arthritis at this time  -Recommend abx  -Follow up with index surgeon Dr. Escobedo for possible transfer of care. Spoke with office and they can be reached at [phone]  -Please page with any questions or concerns    -Discussed with Dr. Way    Ortho Pager [phone]   Orthopaedic Surgery Consult Note    For Surgeon:    HPI:    83 year old female covid +  w/ left elbow fracture  ORIF (Dr. Daly) on 12/7/23 at [facility] w/ ssi for "several weeks"   Per patient completed  5 day course of Keflex, however the pain and redness continued.     Reports she was adivsed by her surgeons for two more days of Keflex from 12/21/23 to today but came into the ED since symptoms have not improve  Denies trauma or injury. Without any new weakness/n/t/p. No other MSK complaints.          PMHx -  HTN and psoriatic arthritis, depression,  PSx -  Left elbow ORIF  Social (-)Tobacco (-)EtOh (-)Elicit substance       Vital Signs Last 24 Hrs  T(C): 37.4 (23 Dec 2023 18:34), Max: 37.4 (23 Dec 2023 18:34)  T(F): 99.4 (23 Dec 2023 18:34), Max: 99.4 (23 Dec 2023 18:34)  HR: 73 (23 Dec 2023 18:34) (73 - 91)  BP: 127/74 (23 Dec 2023 18:34) (127/74 - 144/63)  BP(mean): --  RR: 16 (23 Dec 2023 18:34) (16 - 20)  SpO2: 97% (23 Dec 2023 18:34) (97% - 98%)    Parameters below as of 23 Dec 2023 18:34  Patient On (Oxygen Delivery Method): room air        Physical Exam:  AVSS     Gen: NAD, AOx3     Left?Upper Extremity?   Skin intact?no open injuries   +ttp, swelling, erythema over the?L??elbow  No drainage  Painless ROM of all other extremities. No shoulder, wrist, hand pain on injured side.    AIN/PIN/U/Med/R ?intact to motor   SILT M/R/U/Ax   Palpable radial and ulnar pulses   Brisk cap refill distally   Compartments soft and compressible                           9.1    7.08  )-----------( 303      ( 23 Dec 2023 12:14 )             28.4     12-23    131<L>  |  95<L>  |  19  ----------------------------<  122<H>  4.7   |  29  |  0.51    Ca    9.3      23 Dec 2023 12:14        Imaging: Large elbow joint with soft tissue swelling about the elbow. Findings are of an uncertain etiology. A discrete fracture line is not identified..    A/P: 83yFemale w/ L elbow redness swelling s/p ORIF L elbow fracture  -Low suspicion for septic joint arthritis at this time  -Recommend abx  -Follow up with index surgeon Dr. Escobedo for possible transfer of care. Spoke with office and they can be reached at [phone]  -Please page with any questions or concerns    -Discussed with Dr. Way    Ortho Pager [phone]   Orthopaedic Surgery Consult Note    For Surgeon:    HPI:    83 year old female covid +  w/ left elbow fracture  ORIF (Dr. Escobedo) on 12/7/23 at [facility] w/ ssi for "several weeks"   Per patient completed  5 day course of Keflex, however the pain and redness continued.     Reports she was adivsed by her surgeons for two more days of Keflex from 12/21/23 to today but came into the ED since symptoms have not improve  Denies trauma or injury. Without any new weakness/n/t/p. No other MSK complaints.          PMHx -  HTN and psoriatic arthritis, depression,  PSx -  Left elbow ORIF  Social (-)Tobacco (-)EtOh (-)Elicit substance       Vital Signs Last 24 Hrs  T(C): 37.4 (23 Dec 2023 18:34), Max: 37.4 (23 Dec 2023 18:34)  T(F): 99.4 (23 Dec 2023 18:34), Max: 99.4 (23 Dec 2023 18:34)  HR: 73 (23 Dec 2023 18:34) (73 - 91)  BP: 127/74 (23 Dec 2023 18:34) (127/74 - 144/63)  BP(mean): --  RR: 16 (23 Dec 2023 18:34) (16 - 20)  SpO2: 97% (23 Dec 2023 18:34) (97% - 98%)    Parameters below as of 23 Dec 2023 18:34  Patient On (Oxygen Delivery Method): room air        Physical Exam:  AVSS     Gen: NAD, AOx3     Left?Upper Extremity?   Skin intact?no open injuries   +ttp, swelling, erythema over the?L??elbow  No drainage  Painless ROM of all other extremities. No shoulder, wrist, hand pain on injured side.    AIN/PIN/U/Med/R ?intact to motor   SILT M/R/U/Ax   Palpable radial and ulnar pulses   Brisk cap refill distally   Compartments soft and compressible                           9.1    7.08  )-----------( 303      ( 23 Dec 2023 12:14 )             28.4     12-23    131<L>  |  95<L>  |  19  ----------------------------<  122<H>  4.7   |  29  |  0.51    Ca    9.3      23 Dec 2023 12:14        Imaging: Large elbow joint with soft tissue swelling about the elbow. Findings are of an uncertain etiology. A discrete fracture line is not identified..    A/P: 83yFemale w/ L elbow redness swelling s/p ORIF L elbow fracture  -Low suspicion for septic joint arthritis at this time  -Recommend abx  -Follow up with index surgeon Dr. Escobedo for possible transfer of care. Spoke with office and they can be reached at [phone]  -Please page with any questions or concerns    -Discussed with Dr. Way    Ortho Pager [phone]   Orthopaedic Surgery Consult Note    For Surgeon:    HPI:    83 year old female covid +  w/ left elbow fracture  ORIF (Dr. Escobedo) on 12/7/23 at [facility] w/ ssi for "several weeks"   Per patient completed  5 day course of Keflex, however the pain and redness continued.     Reports she was adivsed by her surgeons for two more days of Keflex from 12/21/23 to today but came into the ED since symptoms have not improve  Denies trauma or injury. Without any new weakness/n/t/p. No other MSK complaints.          PMHx -  HTN and psoriatic arthritis, depression,  PSx -  Left elbow ORIF  Social (-)Tobacco (-)EtOh (-)Elicit substance       Vital Signs Last 24 Hrs  T(C): 37.4 (23 Dec 2023 18:34), Max: 37.4 (23 Dec 2023 18:34)  T(F): 99.4 (23 Dec 2023 18:34), Max: 99.4 (23 Dec 2023 18:34)  HR: 73 (23 Dec 2023 18:34) (73 - 91)  BP: 127/74 (23 Dec 2023 18:34) (127/74 - 144/63)  BP(mean): --  RR: 16 (23 Dec 2023 18:34) (16 - 20)  SpO2: 97% (23 Dec 2023 18:34) (97% - 98%)    Parameters below as of 23 Dec 2023 18:34  Patient On (Oxygen Delivery Method): room air        Physical Exam:  AVSS     Gen: NAD, AOx3     Left?Upper Extremity?   Skin intact?no open injuries   +ttp, swelling, erythema over the?L??elbow  No drainage  Painless ROM of all other extremities. No shoulder, wrist, hand pain on injured side.    AIN/PIN/U/Med/R ?intact to motor   SILT M/R/U/Ax   Palpable radial and ulnar pulses   Brisk cap refill distally   Compartments soft and compressible                           9.1    7.08  )-----------( 303      ( 23 Dec 2023 12:14 )             28.4     12-23    131<L>  |  95<L>  |  19  ----------------------------<  122<H>  4.7   |  29  |  0.51    Ca    9.3      23 Dec 2023 12:14        Imaging: Large elbow joint with soft tissue swelling about the elbow. Findings are of an uncertain etiology. A discrete fracture line is not identified..    A/P: 83yFemale w/ L elbow redness swelling s/p ORIF L elbow fracture  -Low suspicion for septic joint arthritis at this time  -Recommend abx  -Follow up with index surgeon Dr. Escobedo for possible transfer of care. Spoke with office and they can be reached at [phone]  -Please page with any questions or concerns    -Discussed with Dr. Way    Ortho Pager [phone]

## 2023-12-23 NOTE — ED ADULT TRIAGE NOTE - CHIEF COMPLAINT QUOTE
Pt presents to ED c.o worsening L elbow pain. Pt just had elbow surgery for fracture this December. Denies any other complaints. Pt A&Ox4, conversive in full sentences and ambulatory. Pt also endorses COVID + on paxlovid last day tomorrow.  Denies cp, sob, fever or chills.

## 2023-12-23 NOTE — H&P ADULT - PROBLEM SELECTOR PLAN 2
PE concerning for Septic arthritis. Recent surgery at [facility] with Dr. Bedoya.    - ortho consulted, f/u recs  - no surgical interventions  - consider CT if worsening, and to transfer patient to [facility] under Dr. Hall's care PE concerning for Septic arthritis. [facility]. Bedoya.    - ortho consulted, f/u recs  - no surgical interventions  - consider CT if worsening, and to transfer [facility] to [facility] under Dr. Hall's care PE concerning for Septic arthritis. Recent surgery at [facility] with Dr. Bedoya.  , .2    - ortho consulted, f/u recs  - no surgical interventions  - consider CT if worsening, and to transfer patient to [facility] under Dr. Hall's care PE concerning for Septic arthritis. Recent surgery at [facility] with Dr. Bedoya.  , .2    - ortho consulted, f/u recs  - no surgical interventions  - consider CT if worsening, and to transfer patient to [facility] under Dr. Hall's care

## 2023-12-23 NOTE — ED PROVIDER NOTE - PROGRESS NOTE DETAILS
Case discussed with orthopedics, no urgent intervention at this time but pending final recommendations. S/p ortho evaluation with no acute intervention / tap / washout, agree with continuation of antibiotics, can re-consult inpatient as needed.  Will admit for IV antibiotics and serial exams.

## 2023-12-23 NOTE — H&P ADULT - PROBLEM SELECTOR PLAN 8
Plan:  F: none  E: replete K<4, Mg<2  N: dash  VTE Prophylaxis: lovenox  GI: none  C: Full Code  D: [facility] Plan:  F: none  E: replete K<4, Mg<2  N: dash  VTE Prophylaxis: lovenox  GI: none  C: Full Code  D: [facility]

## 2023-12-23 NOTE — H&P ADULT - PROBLEM SELECTOR PLAN 1
Cellulitis of the left upper extremitiy with drainange. No abscess, no streaking.  s/p vancomycin 1000mg x1    - start vancomycin 750mg q12  - vanc trough after 3 dose Midnight 12/25/23  - start ceftriaxone 1000mg q12 hours  - pain: tylenol q6 Cellulitis of the left upper extremitiy with drainange. No abscess, no streaking.  s/p vancomycin 1000mg x1    - start vancomycin 750mg q12  - vanc trough after 3 dose Midnight 12/25/23  - start ceftriaxone 1000mg q12 hours  - pain: tylenol q6 PRN Cellulitis of the left upper extremitiy with drainange. No abscess, no streaking.  s/p vancomycin 1000mg x1    - start vancomycin 750mg q12  - vanc trough after 3rd dose approximately 9AM 12/25/23  - start ceftriaxone 1000mg q12 hours  - pain: tylenol q6 PRN

## 2023-12-23 NOTE — H&P ADULT - HISTORY OF PRESENT ILLNESS
83 year old female with a past medical history significant for HTN and reactive arthritis, left elbow fracture s/p ORIF (Dr. Daly) on 12/7/23 who presented with pain of the left elbow for the past month. She states that after her surgery she completed a 5 day course of Keflex, however the pain and redness continued. She states that she had yellow drainage from the area. She stated that she received keflex for two more days from 12/21/23 to today and came into the ED due to worsening of the symptoms. She also states that she started to feel fatigued and tested positive for covid and completed three days of paxlovid. She denies any chest pain, shortness of breath, abdominal pain, diarrhea, or uri sxs.    In the ED:  Vitals: 98.4f, 91, 144/63, 20RR 98% ra  Labs: WBC 7.08, Hb 9.1, , Na 131, .2, Covid positive  XR left elbow: Large elbow joint with soft tissue swelling about the elbow. Findings are   of an uncertain etiology. A discrete fracture line is not identified.  Interventions: Oxycodone 5/325 x1 , vancomycin 1000mg x1,    83 year old female with a past medical history significant for HTN and reactive arthritis, left elbow fracture s/p ORIF (Dr. Daly) on 12/7/23 who presented with pain of the left elbow for the past month. She states that after her surgery she completed a 5 day course of Keflex, however the pain and redness continued. She states that she had yellow drainage from the area. She stated that she received keflex for two more days from 12/21/23 to today and came into the ED due to worsening of the symptoms. She also states that she started to feel fatigued and tested positive for covid and completed three days of paxlovid. She denies any chest pain, shortness of breath, abdominal pain, diarrhea, or uri sxs.    In the ED:  Vitals: 98.4f, 91, 144/63, 20RR 98% ra  Labs: WBC 7.08, Hb 9.1, , Na 131, .2, Covid positive  XR left elbow: Large elbow joint with soft tissue swelling about the elbow. Findings are   of an uncertain etiology. A discrete fracture line is not identified.  Interventions: Oxycodone 5/325 x1 , vancomycin 1000mg x1,   Consults: Ortho   83 year old female with a past medical history significant for HTN and psoriatic arthritis, depression, left elbow fracture s/p ORIF (Dr. Daly) on 12/7/23 who presented with pain of the left elbow for the past month. She states that after her surgery she completed a 5 day course of Keflex, however the pain and redness continued. She states that she had yellow drainage from the area. She stated that she received keflex for two more days from 12/21/23 to today and came into the ED due to worsening of the symptoms. She also states that she started to feel fatigued and tested positive for covid and completed three days of paxlovid. She denies any chest pain, shortness of breath, abdominal pain, diarrhea, or uri sxs.    In the ED:  Vitals: 98.4f, 91, 144/63, 20RR 98% ra  Labs: WBC 7.08, Hb 9.1, , Na 131, .2, Covid positive  XR left elbow: Large elbow joint with soft tissue swelling about the elbow. Findings are   of an uncertain etiology. A discrete fracture line is not identified.  Interventions: Oxycodone 5/325 x1 , vancomycin 1000mg x1,   Consults: Ortho

## 2023-12-23 NOTE — ED PROVIDER NOTE - OBJECTIVE STATEMENT
83F with HTN and hx reactive arthritis, recent covid on paxlovid, L elbow fracture s/p ?ORIF @ [facility] several weeks ago, with L elbow pain x 2-3 days and after communication with surgical team prescribed keflex (has been taking for 2 days), now with persistent L elbow pain and redness.

## 2023-12-23 NOTE — H&P ADULT - PROBLEM SELECTOR PLAN 5
Na at 131. Possibly iso of SIADH 2/2 pain    - f/u urine studies Na at 131. Possibly iso of SIADH 2/2 pain    - Monitor

## 2023-12-23 NOTE — H&P ADULT - PROBLEM SELECTOR PLAN 4
Hgb on admission , MCV normocytic. Currently no signs of active bleeding (no hematochezia, melena, hemoptysis, hematuria)  - f/u iron panel, LDH, haptoglobin, retic count  - trend CBC  - maintain active T&S  - transfuse if Hgb <7

## 2023-12-23 NOTE — H&P ADULT - NSHPPHYSICALEXAM_GEN_ALL_CORE
T(C): 36.8 (12-23-23 @ 15:31), Max: 36.9 (12-23-23 @ 11:33)  HR: 80 (12-23-23 @ 15:31) (80 - 91)  BP: 140/83 (12-23-23 @ 15:31) (140/83 - 144/63)  RR: 18 (12-23-23 @ 15:31) (18 - 20)  SpO2: 98% (12-23-23 @ 15:31) (98% - 98%)    General: NAD, laying in bed, speaking in full sentences  HEENT: head NC/AT, no conjunctival injection, EOMI, MMM  Neck: supple, no JVD  Cardio: RRR, +S1/S2, no M/R/G  Resp: lungs CTAB, no cough/wheezes/rales/rhonchi  Abdo: soft, NT, ND, +bowel sounds x4, no organomegaly or palpable mass    Extremities: WWP, no edema/cyanosis/clubbing   Vasc: 2+ radial and DP pulses b/l  Neuro: A&Ox3  Psych: speech non-pressured, thoughts goal-oriented  Skin: dry, intact, no visible jaundice   MSK: no joint swelling T(C): 36.8 (12-23-23 @ 15:31), Max: 36.9 (12-23-23 @ 11:33)  HR: 80 (12-23-23 @ 15:31) (80 - 91)  BP: 140/83 (12-23-23 @ 15:31) (140/83 - 144/63)  RR: 18 (12-23-23 @ 15:31) (18 - 20)  SpO2: 98% (12-23-23 @ 15:31) (98% - 98%)    General: NAD, laying in bed, speaking in full sentences  HEENT: bulbar conj non injected, mmm, oral pharynx nonerytematous, pupils 4mm bilaterally briskly reactive to light  Neck: supple  Cardio: RRR  Resp: lungs CTAB, no egophony  Abdo: soft, NT, ND  Extremities: left elbow with surgical scar along dorsal aspect, erythema surrounding the elbow, and extending 2/3s down the distal aspect of the upper extremity with edema, ROM limited secondary to pain and swelling  Vasc: 2+ radial  Neuro: A&Ox3  Psych: speech non-pressured  Skin: dry, intact, no visible jaundice

## 2023-12-23 NOTE — ED PROVIDER NOTE - PHYSICAL EXAMINATION
General: comfortable, resting in ED  HEENT: atraumatic, no eye erythema or discharge  Pulm: no cyanosis, no added work of breathing  Cardiac: extremities warm, intact peripheral pulses including 2+ L radial, good capillary refill L fingers  Neuro: alert, conversant, intact sensation radial/ulnar/median fields of L hand  Psych: neutral affect, cooperative  Msk: left elbow held in partial flexion with pain on small passive range of motion  Skin: left lateral elbow erythema (marked with skin pen today 12/23 at noon), dried drainage on dressing

## 2023-12-23 NOTE — ED ADULT NURSE NOTE - OBJECTIVE STATEMENT
Patient presents to ER aox3 speaking in full sentence c/o left elbow pain x2-3 days. Patient reports recent fracture of this elbow and currently on keflex. pmhx reactive arthritis, htn.

## 2023-12-23 NOTE — ED PROVIDER NOTE - CCCP TRG CHIEF CMPLNT
Reason for Consult: Management of  Hyperglycemia     Surgical Procedure and Date: s/p CABG x 1, MVR  5/25/18; GJ tube placement in IR on 6/7/18     HPI: Patient is a 68 y.o. 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 The etiology of his endocarditis remains unknown. He was admitted to the SICU pre-operatively for IABP placement 5/23/18. Underwent cardiac surgery 5/25/18.   No personal or family hx of DM  Lab Results   Component Value Date    HGBA1C 5.4 02/22/2018     Endocrine consulted for BG mgmt.            elbow pain/injury

## 2023-12-23 NOTE — H&P ADULT - PROBLEM SELECTOR PLAN 6
States that he takes losartan 25 qD    - continue with home med  - continue with pain management Homed med losartan 25 qD    - continue with home med  - continue with pain management

## 2023-12-23 NOTE — ED PROVIDER NOTE - CLINICAL SUMMARY MEDICAL DECISION MAKING FREE TEXT BOX
Given appearance of elbow, concern for postoperative infection.  Patient s/p keflex x 2 days without improvement.  Will escalate antibiotics to include MRSA coverage given recent operative intervention, obtain cultures/inflammatory markers, discuss case with orthopedics consult team for possible intervention and to r/o septic arthritis given pain on passive range of motion.  Will obtain imaging r/ recurrent fracture or dislocation.  No signs of distal neurovascular compromise on exam.

## 2023-12-23 NOTE — H&P ADULT - NSHPSOCIALHISTORY_GEN_ALL_CORE
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

## 2023-12-23 NOTE — H&P ADULT - PROBLEM SELECTOR PLAN 3
Covid positive 12/20/23. Received paxlovid. Exam benign, on RA    - continue to monitor Covid positive 12/20/23. Received paxlovid. Exam benign, on RA    - supportive measures
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
## 2023-12-24 LAB
ALBUMIN SERPL ELPH-MCNC: 3.1 G/DL — LOW (ref 3.3–5)
ALBUMIN SERPL ELPH-MCNC: 3.1 G/DL — LOW (ref 3.3–5)
ALP SERPL-CCNC: 444 U/L — HIGH (ref 40–120)
ALP SERPL-CCNC: 444 U/L — HIGH (ref 40–120)
ALT FLD-CCNC: 205 U/L — HIGH (ref 10–45)
ALT FLD-CCNC: 205 U/L — HIGH (ref 10–45)
ANION GAP SERPL CALC-SCNC: 7 MMOL/L — SIGNIFICANT CHANGE UP (ref 5–17)
ANION GAP SERPL CALC-SCNC: 7 MMOL/L — SIGNIFICANT CHANGE UP (ref 5–17)
AST SERPL-CCNC: 47 U/L — HIGH (ref 10–40)
AST SERPL-CCNC: 47 U/L — HIGH (ref 10–40)
BASOPHILS # BLD AUTO: 0.03 K/UL — SIGNIFICANT CHANGE UP (ref 0–0.2)
BASOPHILS # BLD AUTO: 0.03 K/UL — SIGNIFICANT CHANGE UP (ref 0–0.2)
BASOPHILS NFR BLD AUTO: 0.4 % — SIGNIFICANT CHANGE UP (ref 0–2)
BASOPHILS NFR BLD AUTO: 0.4 % — SIGNIFICANT CHANGE UP (ref 0–2)
BILIRUB SERPL-MCNC: 0.3 MG/DL — SIGNIFICANT CHANGE UP (ref 0.2–1.2)
BILIRUB SERPL-MCNC: 0.3 MG/DL — SIGNIFICANT CHANGE UP (ref 0.2–1.2)
BLD GP AB SCN SERPL QL: NEGATIVE — SIGNIFICANT CHANGE UP
BLD GP AB SCN SERPL QL: NEGATIVE — SIGNIFICANT CHANGE UP
BUN SERPL-MCNC: 18 MG/DL — SIGNIFICANT CHANGE UP (ref 7–23)
BUN SERPL-MCNC: 18 MG/DL — SIGNIFICANT CHANGE UP (ref 7–23)
CALCIUM SERPL-MCNC: 8.8 MG/DL — SIGNIFICANT CHANGE UP (ref 8.4–10.5)
CALCIUM SERPL-MCNC: 8.8 MG/DL — SIGNIFICANT CHANGE UP (ref 8.4–10.5)
CHLORIDE SERPL-SCNC: 99 MMOL/L — SIGNIFICANT CHANGE UP (ref 96–108)
CHLORIDE SERPL-SCNC: 99 MMOL/L — SIGNIFICANT CHANGE UP (ref 96–108)
CO2 SERPL-SCNC: 30 MMOL/L — SIGNIFICANT CHANGE UP (ref 22–31)
CO2 SERPL-SCNC: 30 MMOL/L — SIGNIFICANT CHANGE UP (ref 22–31)
CREAT SERPL-MCNC: 0.61 MG/DL — SIGNIFICANT CHANGE UP (ref 0.5–1.3)
CREAT SERPL-MCNC: 0.61 MG/DL — SIGNIFICANT CHANGE UP (ref 0.5–1.3)
EGFR: 89 ML/MIN/1.73M2 — SIGNIFICANT CHANGE UP
EGFR: 89 ML/MIN/1.73M2 — SIGNIFICANT CHANGE UP
EOSINOPHIL # BLD AUTO: 0.21 K/UL — SIGNIFICANT CHANGE UP (ref 0–0.5)
EOSINOPHIL # BLD AUTO: 0.21 K/UL — SIGNIFICANT CHANGE UP (ref 0–0.5)
EOSINOPHIL NFR BLD AUTO: 2.8 % — SIGNIFICANT CHANGE UP (ref 0–6)
EOSINOPHIL NFR BLD AUTO: 2.8 % — SIGNIFICANT CHANGE UP (ref 0–6)
FERRITIN SERPL-MCNC: 130 NG/ML — SIGNIFICANT CHANGE UP (ref 13–330)
FERRITIN SERPL-MCNC: 130 NG/ML — SIGNIFICANT CHANGE UP (ref 13–330)
GLUCOSE SERPL-MCNC: 114 MG/DL — HIGH (ref 70–99)
GLUCOSE SERPL-MCNC: 114 MG/DL — HIGH (ref 70–99)
HAPTOGLOB SERPL-MCNC: 260 MG/DL — HIGH (ref 34–200)
HAPTOGLOB SERPL-MCNC: 260 MG/DL — HIGH (ref 34–200)
HCT VFR BLD CALC: 26.3 % — LOW (ref 34.5–45)
HCT VFR BLD CALC: 26.3 % — LOW (ref 34.5–45)
HGB BLD-MCNC: 8.3 G/DL — LOW (ref 11.5–15.5)
HGB BLD-MCNC: 8.3 G/DL — LOW (ref 11.5–15.5)
IMM GRANULOCYTES NFR BLD AUTO: 0.9 % — SIGNIFICANT CHANGE UP (ref 0–0.9)
IMM GRANULOCYTES NFR BLD AUTO: 0.9 % — SIGNIFICANT CHANGE UP (ref 0–0.9)
IRON SATN MFR SERPL: 15 UG/DL — LOW (ref 30–160)
IRON SATN MFR SERPL: 15 UG/DL — LOW (ref 30–160)
IRON SATN MFR SERPL: 8 % — LOW (ref 14–50)
IRON SATN MFR SERPL: 8 % — LOW (ref 14–50)
LDH SERPL L TO P-CCNC: 166 U/L — SIGNIFICANT CHANGE UP (ref 50–242)
LDH SERPL L TO P-CCNC: 166 U/L — SIGNIFICANT CHANGE UP (ref 50–242)
LYMPHOCYTES # BLD AUTO: 1.72 K/UL — SIGNIFICANT CHANGE UP (ref 1–3.3)
LYMPHOCYTES # BLD AUTO: 1.72 K/UL — SIGNIFICANT CHANGE UP (ref 1–3.3)
LYMPHOCYTES # BLD AUTO: 22.5 % — SIGNIFICANT CHANGE UP (ref 13–44)
LYMPHOCYTES # BLD AUTO: 22.5 % — SIGNIFICANT CHANGE UP (ref 13–44)
MAGNESIUM SERPL-MCNC: 2 MG/DL — SIGNIFICANT CHANGE UP (ref 1.6–2.6)
MAGNESIUM SERPL-MCNC: 2 MG/DL — SIGNIFICANT CHANGE UP (ref 1.6–2.6)
MCHC RBC-ENTMCNC: 29.2 PG — SIGNIFICANT CHANGE UP (ref 27–34)
MCHC RBC-ENTMCNC: 29.2 PG — SIGNIFICANT CHANGE UP (ref 27–34)
MCHC RBC-ENTMCNC: 31.6 GM/DL — LOW (ref 32–36)
MCHC RBC-ENTMCNC: 31.6 GM/DL — LOW (ref 32–36)
MCV RBC AUTO: 92.6 FL — SIGNIFICANT CHANGE UP (ref 80–100)
MCV RBC AUTO: 92.6 FL — SIGNIFICANT CHANGE UP (ref 80–100)
MONOCYTES # BLD AUTO: 0.89 K/UL — SIGNIFICANT CHANGE UP (ref 0–0.9)
MONOCYTES # BLD AUTO: 0.89 K/UL — SIGNIFICANT CHANGE UP (ref 0–0.9)
MONOCYTES NFR BLD AUTO: 11.7 % — SIGNIFICANT CHANGE UP (ref 2–14)
MONOCYTES NFR BLD AUTO: 11.7 % — SIGNIFICANT CHANGE UP (ref 2–14)
NEUTROPHILS # BLD AUTO: 4.71 K/UL — SIGNIFICANT CHANGE UP (ref 1.8–7.4)
NEUTROPHILS # BLD AUTO: 4.71 K/UL — SIGNIFICANT CHANGE UP (ref 1.8–7.4)
NEUTROPHILS NFR BLD AUTO: 61.7 % — SIGNIFICANT CHANGE UP (ref 43–77)
NEUTROPHILS NFR BLD AUTO: 61.7 % — SIGNIFICANT CHANGE UP (ref 43–77)
NRBC # BLD: 0 /100 WBCS — SIGNIFICANT CHANGE UP (ref 0–0)
NRBC # BLD: 0 /100 WBCS — SIGNIFICANT CHANGE UP (ref 0–0)
PHOSPHATE SERPL-MCNC: 4.2 MG/DL — SIGNIFICANT CHANGE UP (ref 2.5–4.5)
PHOSPHATE SERPL-MCNC: 4.2 MG/DL — SIGNIFICANT CHANGE UP (ref 2.5–4.5)
PLATELET # BLD AUTO: 342 K/UL — SIGNIFICANT CHANGE UP (ref 150–400)
PLATELET # BLD AUTO: 342 K/UL — SIGNIFICANT CHANGE UP (ref 150–400)
POTASSIUM SERPL-MCNC: 4.1 MMOL/L — SIGNIFICANT CHANGE UP (ref 3.5–5.3)
POTASSIUM SERPL-MCNC: 4.1 MMOL/L — SIGNIFICANT CHANGE UP (ref 3.5–5.3)
POTASSIUM SERPL-SCNC: 4.1 MMOL/L — SIGNIFICANT CHANGE UP (ref 3.5–5.3)
POTASSIUM SERPL-SCNC: 4.1 MMOL/L — SIGNIFICANT CHANGE UP (ref 3.5–5.3)
PROT SERPL-MCNC: 6.1 G/DL — SIGNIFICANT CHANGE UP (ref 6–8.3)
PROT SERPL-MCNC: 6.1 G/DL — SIGNIFICANT CHANGE UP (ref 6–8.3)
RBC # BLD: 2.84 M/UL — LOW (ref 3.8–5.2)
RBC # FLD: 14.6 % — HIGH (ref 10.3–14.5)
RBC # FLD: 14.6 % — HIGH (ref 10.3–14.5)
RETICS #: 43.2 K/UL — SIGNIFICANT CHANGE UP (ref 25–125)
RETICS #: 43.2 K/UL — SIGNIFICANT CHANGE UP (ref 25–125)
RETICS/RBC NFR: 1.5 % — SIGNIFICANT CHANGE UP (ref 0.5–2.5)
RETICS/RBC NFR: 1.5 % — SIGNIFICANT CHANGE UP (ref 0.5–2.5)
RH IG SCN BLD-IMP: POSITIVE — SIGNIFICANT CHANGE UP
RH IG SCN BLD-IMP: POSITIVE — SIGNIFICANT CHANGE UP
SODIUM SERPL-SCNC: 136 MMOL/L — SIGNIFICANT CHANGE UP (ref 135–145)
SODIUM SERPL-SCNC: 136 MMOL/L — SIGNIFICANT CHANGE UP (ref 135–145)
TIBC SERPL-MCNC: 198 UG/DL — LOW (ref 220–430)
TIBC SERPL-MCNC: 198 UG/DL — LOW (ref 220–430)
UIBC SERPL-MCNC: 183 UG/DL — SIGNIFICANT CHANGE UP (ref 110–370)
UIBC SERPL-MCNC: 183 UG/DL — SIGNIFICANT CHANGE UP (ref 110–370)
WBC # BLD: 7.63 K/UL — SIGNIFICANT CHANGE UP (ref 3.8–10.5)
WBC # BLD: 7.63 K/UL — SIGNIFICANT CHANGE UP (ref 3.8–10.5)
WBC # FLD AUTO: 7.63 K/UL — SIGNIFICANT CHANGE UP (ref 3.8–10.5)
WBC # FLD AUTO: 7.63 K/UL — SIGNIFICANT CHANGE UP (ref 3.8–10.5)

## 2023-12-24 PROCEDURE — 76705 ECHO EXAM OF ABDOMEN: CPT | Mod: 26

## 2023-12-24 PROCEDURE — 99233 SBSQ HOSP IP/OBS HIGH 50: CPT

## 2023-12-24 RX ORDER — VANCOMYCIN HCL 1 G
750 VIAL (EA) INTRAVENOUS EVERY 24 HOURS
Refills: 0 | Status: COMPLETED | OUTPATIENT
Start: 2023-12-24 | End: 2023-12-25

## 2023-12-24 RX ORDER — TRANYLCYPROMINE SULFATE 10 MG/1
1 TABLET, FILM COATED ORAL
Refills: 0 | DISCHARGE

## 2023-12-24 RX ORDER — LOSARTAN POTASSIUM 100 MG/1
1 TABLET, FILM COATED ORAL
Refills: 0 | DISCHARGE

## 2023-12-24 RX ADMIN — Medication 250 MILLIGRAM(S): at 12:55

## 2023-12-24 RX ADMIN — ENOXAPARIN SODIUM 30 MILLIGRAM(S): 100 INJECTION SUBCUTANEOUS at 18:29

## 2023-12-24 RX ADMIN — LOSARTAN POTASSIUM 25 MILLIGRAM(S): 100 TABLET, FILM COATED ORAL at 05:59

## 2023-12-24 RX ADMIN — CEFTRIAXONE 100 MILLIGRAM(S): 500 INJECTION, POWDER, FOR SOLUTION INTRAMUSCULAR; INTRAVENOUS at 18:29

## 2023-12-24 NOTE — PATIENT PROFILE ADULT - FALL HARM RISK - UNIVERSAL INTERVENTIONS
Bed in lowest position, wheels locked, appropriate side rails in place/Call bell, personal items and telephone in reach/Instruct patient to call for assistance before getting out of bed or chair/Non-slip footwear when patient is out of bed/[place] to call system/Physically safe environment - no spills, clutter or unnecessary equipment/Purposeful Proactive Rounding/Room/bathroom lighting operational, light cord in reach Bed in lowest position, wheels locked, appropriate side rails in place/Call bell, personal items and telephone in reach/Instruct patient to call for assistance before getting out of bed or chair/Non-slip footwear when patient is out of bed/[place] to call system/Physically safe environment - no spills, clutter or unnecessary equipment/Purposeful Proactive Rounding/Room/bathroom lighting operational, light cord in reach

## 2023-12-24 NOTE — PROGRESS NOTE ADULT - SUBJECTIVE AND OBJECTIVE BOX
INTERVAL EVENTS: Continues to have L elbow pain     PAST MEDICAL & SURGICAL HISTORY:  Hypertension    Left elbow fracture        MEDICATIONS  (STANDING):  cefTRIAXone   IVPB 1000 milliGRAM(s) IV Intermittent every 24 hours  enoxaparin Injectable 30 milliGRAM(s) SubCutaneous every 24 hours  losartan 25 milliGRAM(s) Oral daily  vancomycin  IVPB 750 milliGRAM(s) IV Intermittent every 24 hours    MEDICATIONS  (PRN):  acetaminophen     Tablet .. 650 milliGRAM(s) Oral every 6 hours PRN Temp greater or equal to 38C (100.4F), Mild Pain (1 - 3)  melatonin 3 milliGRAM(s) Oral at bedtime PRN Insomnia    Vital Signs Last 24 Hrs  T(C): 36.7 (24 Dec 2023 08:38), Max: 37.4 (23 Dec 2023 18:34)  T(F): 98.1 (24 Dec 2023 08:38), Max: 99.4 (23 Dec 2023 18:34)  HR: 90 (24 Dec 2023 08:38) (73 - 94)  BP: 112/75 (24 Dec 2023 08:38) (112/75 - 140/83)  BP(mean): --  RR: 18 (24 Dec 2023 08:38) (16 - 18)  SpO2: 93% (24 Dec 2023 08:38) (93% - 98%)    Parameters below as of 24 Dec 2023 08:38  Patient On (Oxygen Delivery Method): room air        PHYSICAL EXAM:  GEN: Awake, alert. NAD.   HEENT: NCAT, PERRL, EOMI. Mucosa moist. No JVD.  RESP: CTA b/l  CV: RRR. Normal S1/S2. No m/r/g.  ABD: Soft. NT/ND. BS+  EXT: Warm. Left UE with pain on palpation. ROM limited by pain. Mild erythema within previously demarcated borders. No fluctuance but edema is present     LABS:                        8.3    7.63  )-----------( 342      ( 24 Dec 2023 07:06 )             26.3     12-24    136  |  99  |  18  ----------------------------<  114<H>  4.1   |  30  |  0.61    Ca    8.8      24 Dec 2023 07:06  Phos  4.2     12-24  Mg     2.0     12-24    TPro  6.1  /  Alb  3.1<L>  /  TBili  0.3  /  DBili  x   /  AST  47<H>  /  ALT  205<H>  /  AlkPhos  444<H>  12-24          Urinalysis Basic - ( 24 Dec 2023 07:06 )    Color: x / Appearance: x / SG: x / pH: x  Gluc: 114 mg/dL / Ketone: x  / Bili: x / Urobili: x   Blood: x / Protein: x / Nitrite: x   Leuk Esterase: x / RBC: x / WBC x   Sq Epi: x / Non Sq Epi: x / Bacteria: x      I&O's Summary
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
## 2023-12-25 DIAGNOSIS — R74.8 ABNORMAL LEVELS OF OTHER SERUM ENZYMES: ICD-10-CM

## 2023-12-25 DIAGNOSIS — M86.9 OSTEOMYELITIS, UNSPECIFIED: ICD-10-CM

## 2023-12-25 LAB
ALBUMIN SERPL ELPH-MCNC: 2.7 G/DL — LOW (ref 3.3–5)
ALBUMIN SERPL ELPH-MCNC: 2.7 G/DL — LOW (ref 3.3–5)
ALP SERPL-CCNC: 413 U/L — HIGH (ref 40–120)
ALP SERPL-CCNC: 413 U/L — HIGH (ref 40–120)
ALT FLD-CCNC: 151 U/L — HIGH (ref 10–45)
ALT FLD-CCNC: 151 U/L — HIGH (ref 10–45)
ANION GAP SERPL CALC-SCNC: 10 MMOL/L — SIGNIFICANT CHANGE UP (ref 5–17)
ANION GAP SERPL CALC-SCNC: 10 MMOL/L — SIGNIFICANT CHANGE UP (ref 5–17)
AST SERPL-CCNC: 35 U/L — SIGNIFICANT CHANGE UP (ref 10–40)
AST SERPL-CCNC: 35 U/L — SIGNIFICANT CHANGE UP (ref 10–40)
BASOPHILS # BLD AUTO: 0.03 K/UL — SIGNIFICANT CHANGE UP (ref 0–0.2)
BASOPHILS # BLD AUTO: 0.03 K/UL — SIGNIFICANT CHANGE UP (ref 0–0.2)
BASOPHILS NFR BLD AUTO: 0.4 % — SIGNIFICANT CHANGE UP (ref 0–2)
BASOPHILS NFR BLD AUTO: 0.4 % — SIGNIFICANT CHANGE UP (ref 0–2)
BILIRUB SERPL-MCNC: 0.3 MG/DL — SIGNIFICANT CHANGE UP (ref 0.2–1.2)
BILIRUB SERPL-MCNC: 0.3 MG/DL — SIGNIFICANT CHANGE UP (ref 0.2–1.2)
BUN SERPL-MCNC: 24 MG/DL — HIGH (ref 7–23)
BUN SERPL-MCNC: 24 MG/DL — HIGH (ref 7–23)
CALCIUM SERPL-MCNC: 8.9 MG/DL — SIGNIFICANT CHANGE UP (ref 8.4–10.5)
CALCIUM SERPL-MCNC: 8.9 MG/DL — SIGNIFICANT CHANGE UP (ref 8.4–10.5)
CHLORIDE SERPL-SCNC: 105 MMOL/L — SIGNIFICANT CHANGE UP (ref 96–108)
CHLORIDE SERPL-SCNC: 105 MMOL/L — SIGNIFICANT CHANGE UP (ref 96–108)
CO2 SERPL-SCNC: 25 MMOL/L — SIGNIFICANT CHANGE UP (ref 22–31)
CO2 SERPL-SCNC: 25 MMOL/L — SIGNIFICANT CHANGE UP (ref 22–31)
CREAT SERPL-MCNC: 0.53 MG/DL — SIGNIFICANT CHANGE UP (ref 0.5–1.3)
CREAT SERPL-MCNC: 0.53 MG/DL — SIGNIFICANT CHANGE UP (ref 0.5–1.3)
EGFR: 92 ML/MIN/1.73M2 — SIGNIFICANT CHANGE UP
EGFR: 92 ML/MIN/1.73M2 — SIGNIFICANT CHANGE UP
EOSINOPHIL # BLD AUTO: 0.4 K/UL — SIGNIFICANT CHANGE UP (ref 0–0.5)
EOSINOPHIL # BLD AUTO: 0.4 K/UL — SIGNIFICANT CHANGE UP (ref 0–0.5)
EOSINOPHIL NFR BLD AUTO: 4.8 % — SIGNIFICANT CHANGE UP (ref 0–6)
EOSINOPHIL NFR BLD AUTO: 4.8 % — SIGNIFICANT CHANGE UP (ref 0–6)
GLUCOSE SERPL-MCNC: 116 MG/DL — HIGH (ref 70–99)
GLUCOSE SERPL-MCNC: 116 MG/DL — HIGH (ref 70–99)
HCT VFR BLD CALC: 29.8 % — LOW (ref 34.5–45)
HCT VFR BLD CALC: 29.8 % — LOW (ref 34.5–45)
HGB BLD-MCNC: 9.4 G/DL — LOW (ref 11.5–15.5)
HGB BLD-MCNC: 9.4 G/DL — LOW (ref 11.5–15.5)
IMM GRANULOCYTES NFR BLD AUTO: 1.6 % — HIGH (ref 0–0.9)
IMM GRANULOCYTES NFR BLD AUTO: 1.6 % — HIGH (ref 0–0.9)
LYMPHOCYTES # BLD AUTO: 2.06 K/UL — SIGNIFICANT CHANGE UP (ref 1–3.3)
LYMPHOCYTES # BLD AUTO: 2.06 K/UL — SIGNIFICANT CHANGE UP (ref 1–3.3)
LYMPHOCYTES # BLD AUTO: 25 % — SIGNIFICANT CHANGE UP (ref 13–44)
LYMPHOCYTES # BLD AUTO: 25 % — SIGNIFICANT CHANGE UP (ref 13–44)
MAGNESIUM SERPL-MCNC: 2 MG/DL — SIGNIFICANT CHANGE UP (ref 1.6–2.6)
MAGNESIUM SERPL-MCNC: 2 MG/DL — SIGNIFICANT CHANGE UP (ref 1.6–2.6)
MCHC RBC-ENTMCNC: 29.2 PG — SIGNIFICANT CHANGE UP (ref 27–34)
MCHC RBC-ENTMCNC: 29.2 PG — SIGNIFICANT CHANGE UP (ref 27–34)
MCHC RBC-ENTMCNC: 31.5 GM/DL — LOW (ref 32–36)
MCHC RBC-ENTMCNC: 31.5 GM/DL — LOW (ref 32–36)
MCV RBC AUTO: 92.5 FL — SIGNIFICANT CHANGE UP (ref 80–100)
MCV RBC AUTO: 92.5 FL — SIGNIFICANT CHANGE UP (ref 80–100)
MONOCYTES # BLD AUTO: 0.99 K/UL — HIGH (ref 0–0.9)
MONOCYTES # BLD AUTO: 0.99 K/UL — HIGH (ref 0–0.9)
MONOCYTES NFR BLD AUTO: 12 % — SIGNIFICANT CHANGE UP (ref 2–14)
MONOCYTES NFR BLD AUTO: 12 % — SIGNIFICANT CHANGE UP (ref 2–14)
NEUTROPHILS # BLD AUTO: 4.64 K/UL — SIGNIFICANT CHANGE UP (ref 1.8–7.4)
NEUTROPHILS # BLD AUTO: 4.64 K/UL — SIGNIFICANT CHANGE UP (ref 1.8–7.4)
NEUTROPHILS NFR BLD AUTO: 56.2 % — SIGNIFICANT CHANGE UP (ref 43–77)
NEUTROPHILS NFR BLD AUTO: 56.2 % — SIGNIFICANT CHANGE UP (ref 43–77)
NRBC # BLD: 0 /100 WBCS — SIGNIFICANT CHANGE UP (ref 0–0)
NRBC # BLD: 0 /100 WBCS — SIGNIFICANT CHANGE UP (ref 0–0)
PHOSPHATE SERPL-MCNC: 5 MG/DL — HIGH (ref 2.5–4.5)
PHOSPHATE SERPL-MCNC: 5 MG/DL — HIGH (ref 2.5–4.5)
PLATELET # BLD AUTO: 424 K/UL — HIGH (ref 150–400)
PLATELET # BLD AUTO: 424 K/UL — HIGH (ref 150–400)
POTASSIUM SERPL-MCNC: 4.4 MMOL/L — SIGNIFICANT CHANGE UP (ref 3.5–5.3)
POTASSIUM SERPL-MCNC: 4.4 MMOL/L — SIGNIFICANT CHANGE UP (ref 3.5–5.3)
POTASSIUM SERPL-SCNC: 4.4 MMOL/L — SIGNIFICANT CHANGE UP (ref 3.5–5.3)
POTASSIUM SERPL-SCNC: 4.4 MMOL/L — SIGNIFICANT CHANGE UP (ref 3.5–5.3)
PROT SERPL-MCNC: 6.4 G/DL — SIGNIFICANT CHANGE UP (ref 6–8.3)
PROT SERPL-MCNC: 6.4 G/DL — SIGNIFICANT CHANGE UP (ref 6–8.3)
RBC # BLD: 3.22 M/UL — LOW (ref 3.8–5.2)
RBC # BLD: 3.22 M/UL — LOW (ref 3.8–5.2)
RBC # FLD: 14.6 % — HIGH (ref 10.3–14.5)
RBC # FLD: 14.6 % — HIGH (ref 10.3–14.5)
SODIUM SERPL-SCNC: 140 MMOL/L — SIGNIFICANT CHANGE UP (ref 135–145)
SODIUM SERPL-SCNC: 140 MMOL/L — SIGNIFICANT CHANGE UP (ref 135–145)
VANCOMYCIN TROUGH SERPL-MCNC: <4 UG/ML — LOW (ref 10–20)
VANCOMYCIN TROUGH SERPL-MCNC: <4 UG/ML — LOW (ref 10–20)
WBC # BLD: 8.25 K/UL — SIGNIFICANT CHANGE UP (ref 3.8–10.5)
WBC # BLD: 8.25 K/UL — SIGNIFICANT CHANGE UP (ref 3.8–10.5)
WBC # FLD AUTO: 8.25 K/UL — SIGNIFICANT CHANGE UP (ref 3.8–10.5)
WBC # FLD AUTO: 8.25 K/UL — SIGNIFICANT CHANGE UP (ref 3.8–10.5)

## 2023-12-25 PROCEDURE — 99233 SBSQ HOSP IP/OBS HIGH 50: CPT

## 2023-12-25 RX ORDER — VANCOMYCIN HCL 1 G
750 VIAL (EA) INTRAVENOUS EVERY 24 HOURS
Refills: 0 | Status: DISCONTINUED | OUTPATIENT
Start: 2023-12-25 | End: 2023-12-25

## 2023-12-25 RX ADMIN — Medication 650 MILLIGRAM(S): at 01:22

## 2023-12-25 RX ADMIN — Medication 650 MILLIGRAM(S): at 10:07

## 2023-12-25 RX ADMIN — Medication 650 MILLIGRAM(S): at 00:22

## 2023-12-25 RX ADMIN — LOSARTAN POTASSIUM 25 MILLIGRAM(S): 100 TABLET, FILM COATED ORAL at 06:20

## 2023-12-25 RX ADMIN — ENOXAPARIN SODIUM 30 MILLIGRAM(S): 100 INJECTION SUBCUTANEOUS at 18:07

## 2023-12-25 RX ADMIN — Medication 650 MILLIGRAM(S): at 22:50

## 2023-12-25 RX ADMIN — Medication 650 MILLIGRAM(S): at 09:07

## 2023-12-25 RX ADMIN — CEFTRIAXONE 100 MILLIGRAM(S): 500 INJECTION, POWDER, FOR SOLUTION INTRAMUSCULAR; INTRAVENOUS at 18:08

## 2023-12-25 RX ADMIN — Medication 250 MILLIGRAM(S): at 13:00

## 2023-12-25 RX ADMIN — Medication 650 MILLIGRAM(S): at 22:29
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
## 2023-12-25 NOTE — PROGRESS NOTE ADULT - PROBLEM SELECTOR PLAN 2
PE concerning for Septic arthritis. Recent surgery at [facility] with Dr. Bedoya.  , .2  - ortho consulted, f/u recs  - no surgical interventions  - f/u CT PE concerning for Septic arthritis. [facility] surgery [facility] hospitals with Dr. Bedoya.  , .2  - ortho consulted, f/u recs  - no surgical interventions  - f/u CT

## 2023-12-25 NOTE — PROGRESS NOTE ADULT - PROBLEM SELECTOR PLAN 10
Plan:  F: none  E: replete K<4, Mg<2  N: dash  VTE Prophylaxis: lovenox  GI: none  C: Full Code  D: [facility] Plan:  F: none  E: replete K<4, Mg<2  N: dash  VTE Prophylaxis: lovenox  GI: none  C: Full Code  D: [facility]

## 2023-12-25 NOTE — PROGRESS NOTE ADULT - PROBLEM SELECTOR PLAN 9
Plan:  F: none  E: replete K<4, Mg<2  N: dash  VTE Prophylaxis: lovenox  GI: none  C: Full Code  D: [facility] Plan:  F: none  E: replete K<4, Mg<2  N: dash  VTE Prophylaxis: lovenox  GI: none  C: Full Code  D: [facility] Rule out Osteomyelitis. Patient has left upper extremity, swelling, and erythema. ESR and CRP very high, alk phos elevated  - Concern for OM  - c/w broad spectrum abx  - f/u CT scan  - f/u ortho recs

## 2023-12-25 NOTE — PROGRESS NOTE ADULT - PROBLEM SELECTOR PLAN 8
Home med [id number] five times a day  - unavailable in patient pharmacy, will ask patient family member to bring in Home med [id number] five times a day  - unavailable in patient pharmacy, will ask patient family member to bring in

## 2023-12-25 NOTE — PROGRESS NOTE ADULT - PROBLEM SELECTOR PLAN 1
Cellulitis of the left upper extremitiy with drainange. No abscess, no streaking. ESR 1  s/p vancomycin 1000mg x1  - vancomycin 750mg q24 - trough Dec 27th  - ceftriaxone 1000mg q12 hours  - pain: tylenol q6 PRN  - f/u Bcx - NG@1 day Cellulitis of the left upper extremitiy with drainange. No abscess, no streaking.   s/p vancomycin 1000mg x1  - vancomycin 750mg q24 - trough Dec 27th  - ceftriaxone 1000mg q12 hours  - pain: tylenol q6 PRN  - f/u Bcx - NG@1 day

## 2023-12-25 NOTE — PROGRESS NOTE ADULT - SUBJECTIVE AND OBJECTIVE BOX
INTERVAL HPI/OVERNIGHT EVENTS:  As per night team, no overnight events. Patient seen and examined at bedside. States swelling not improved. Pain 3/10 and well controlled on Tylenol Patient denies fever, chills, dizziness, weakness, HA, CP, SOB, N/V/D/C    VITALS  Vital Signs Last 24 Hrs  T(C): 36.8 (25 Dec 2023 08:30), Max: 37.7 (24 Dec 2023 17:23)  T(F): 98.2 (25 Dec 2023 08:30), Max: 99.9 (24 Dec 2023 17:23)  HR: 86 (25 Dec 2023 08:30) (85 - 94)  BP: 118/74 (25 Dec 2023 08:30) (112/69 - 118/76)  BP(mean): 87 (24 Dec 2023 17:23) (87 - 87)  RR: 16 (25 Dec 2023 08:30) (16 - 18)  SpO2: 95% (25 Dec 2023 08:30) (92% - 97%)    Parameters below as of 25 Dec 2023 08:30  Patient On (Oxygen Delivery Method): room air    PHYSICAL EXAM  General: NAD, laying in bed, speaking in full sentences  HEENT: bulbar conj non injected, mmm, oral pharynx nonerytematous, pupils 4mm bilaterally briskly reactive to light  Neck: supple  Cardio: RRR  Resp: lungs CTAB, no egophony  Abdo: soft, NT, ND  Extremities: left elbow with surgical scar along dorsal aspect, erythema surrounding the elbow, and extending 2/3s down the distal aspect of the upper extremity with edema, ROM limited secondary to pain and swelling  Vasc: 2+ radial  Neuro: A&Ox3  Psych: speech non-pressured  Skin: dry, intact, no visible jaundice    MEDICATIONS  (STANDING):  cefTRIAXone   IVPB 1000 milliGRAM(s) IV Intermittent every 24 hours  enoxaparin Injectable 30 milliGRAM(s) SubCutaneous every 24 hours  losartan 25 milliGRAM(s) Oral daily  vancomycin  IVPB 750 milliGRAM(s) IV Intermittent every 24 hours    MEDICATIONS  (PRN):  acetaminophen     Tablet .. 650 milliGRAM(s) Oral every 6 hours PRN Temp greater or equal to 38C (100.4F), Mild Pain (1 - 3)  melatonin 3 milliGRAM(s) Oral at bedtime PRN Insomnia      No Known Allergies      LABS                        9.4    8.25  )-----------( 424      ( 25 Dec 2023 07:12 )             29.8     12-25    140  |  105  |  24<H>  ----------------------------<  116<H>  4.4   |  25  |  0.53    Ca    8.9      25 Dec 2023 07:12  Phos  5.0     12-25  Mg     2.0     12-25    TPro  6.4  /  Alb  2.7<L>  /  TBili  0.3  /  DBili  x   /  AST  35  /  ALT  151<H>  /  AlkPhos  413<H>  12-25      Urinalysis Basic - ( 25 Dec 2023 07:12 )    Color: x / Appearance: x / SG: x / pH: x  Gluc: 116 mg/dL / Ketone: x  / Bili: x / Urobili: x   Blood: x / Protein: x / Nitrite: x   Leuk Esterase: x / RBC: x / WBC x   Sq Epi: x / Non Sq Epi: x / Bacteria: x      RADIOLOGY & ADDITIONAL TESTS: Reviewed

## 2023-12-25 NOTE — PROGRESS NOTE ADULT - PROBLEM SELECTOR PLAN 4
Hgb on admission , MCV normocytic. Currently no signs of active bleeding (no hematochezia, melena, hemoptysis, hematuria)  - trend CBC  - maintain active T&S  - transfuse if Hgb <7

## 2023-12-26 ENCOUNTER — TRANSCRIPTION ENCOUNTER (OUTPATIENT)
Age: 83
End: 2023-12-26

## 2023-12-26 VITALS
HEART RATE: 84 BPM | RESPIRATION RATE: 18 BRPM | SYSTOLIC BLOOD PRESSURE: 147 MMHG | OXYGEN SATURATION: 97 % | TEMPERATURE: 98 F | DIASTOLIC BLOOD PRESSURE: 80 MMHG

## 2023-12-26 LAB
ALBUMIN SERPL ELPH-MCNC: 3.2 G/DL — LOW (ref 3.3–5)
ALBUMIN SERPL ELPH-MCNC: 3.2 G/DL — LOW (ref 3.3–5)
ALP SERPL-CCNC: 389 U/L — HIGH (ref 40–120)
ALP SERPL-CCNC: 389 U/L — HIGH (ref 40–120)
ALT FLD-CCNC: 116 U/L — HIGH (ref 10–45)
ALT FLD-CCNC: 116 U/L — HIGH (ref 10–45)
ANION GAP SERPL CALC-SCNC: 9 MMOL/L — SIGNIFICANT CHANGE UP (ref 5–17)
ANION GAP SERPL CALC-SCNC: 9 MMOL/L — SIGNIFICANT CHANGE UP (ref 5–17)
ANISOCYTOSIS BLD QL: SLIGHT — SIGNIFICANT CHANGE UP
ANISOCYTOSIS BLD QL: SLIGHT — SIGNIFICANT CHANGE UP
AST SERPL-CCNC: 25 U/L — SIGNIFICANT CHANGE UP (ref 10–40)
AST SERPL-CCNC: 25 U/L — SIGNIFICANT CHANGE UP (ref 10–40)
BASOPHILS # BLD AUTO: 0.09 K/UL — SIGNIFICANT CHANGE UP (ref 0–0.2)
BASOPHILS # BLD AUTO: 0.09 K/UL — SIGNIFICANT CHANGE UP (ref 0–0.2)
BASOPHILS NFR BLD AUTO: 0.9 % — SIGNIFICANT CHANGE UP (ref 0–2)
BASOPHILS NFR BLD AUTO: 0.9 % — SIGNIFICANT CHANGE UP (ref 0–2)
BILIRUB SERPL-MCNC: 0.2 MG/DL — SIGNIFICANT CHANGE UP (ref 0.2–1.2)
BILIRUB SERPL-MCNC: 0.2 MG/DL — SIGNIFICANT CHANGE UP (ref 0.2–1.2)
BLD GP AB SCN SERPL QL: NEGATIVE — SIGNIFICANT CHANGE UP
BLD GP AB SCN SERPL QL: NEGATIVE — SIGNIFICANT CHANGE UP
BUN SERPL-MCNC: 32 MG/DL — HIGH (ref 7–23)
BUN SERPL-MCNC: 32 MG/DL — HIGH (ref 7–23)
CALCIUM SERPL-MCNC: 9 MG/DL — SIGNIFICANT CHANGE UP (ref 8.4–10.5)
CALCIUM SERPL-MCNC: 9 MG/DL — SIGNIFICANT CHANGE UP (ref 8.4–10.5)
CHLORIDE SERPL-SCNC: 102 MMOL/L — SIGNIFICANT CHANGE UP (ref 96–108)
CHLORIDE SERPL-SCNC: 102 MMOL/L — SIGNIFICANT CHANGE UP (ref 96–108)
CO2 SERPL-SCNC: 25 MMOL/L — SIGNIFICANT CHANGE UP (ref 22–31)
CO2 SERPL-SCNC: 25 MMOL/L — SIGNIFICANT CHANGE UP (ref 22–31)
CREAT SERPL-MCNC: 0.55 MG/DL — SIGNIFICANT CHANGE UP (ref 0.5–1.3)
CREAT SERPL-MCNC: 0.55 MG/DL — SIGNIFICANT CHANGE UP (ref 0.5–1.3)
CRP SERPL-MCNC: 51.3 MG/L — HIGH (ref 0–4)
CRP SERPL-MCNC: 51.3 MG/L — HIGH (ref 0–4)
EGFR: 91 ML/MIN/1.73M2 — SIGNIFICANT CHANGE UP
EGFR: 91 ML/MIN/1.73M2 — SIGNIFICANT CHANGE UP
EOSINOPHIL # BLD AUTO: 0.42 K/UL — SIGNIFICANT CHANGE UP (ref 0–0.5)
EOSINOPHIL # BLD AUTO: 0.42 K/UL — SIGNIFICANT CHANGE UP (ref 0–0.5)
EOSINOPHIL NFR BLD AUTO: 4.4 % — SIGNIFICANT CHANGE UP (ref 0–6)
EOSINOPHIL NFR BLD AUTO: 4.4 % — SIGNIFICANT CHANGE UP (ref 0–6)
ERYTHROCYTE [SEDIMENTATION RATE] IN BLOOD: 104 MM/HR — HIGH
ERYTHROCYTE [SEDIMENTATION RATE] IN BLOOD: 104 MM/HR — HIGH
GLUCOSE SERPL-MCNC: 123 MG/DL — HIGH (ref 70–99)
GLUCOSE SERPL-MCNC: 123 MG/DL — HIGH (ref 70–99)
HCT VFR BLD CALC: 31.9 % — LOW (ref 34.5–45)
HCT VFR BLD CALC: 31.9 % — LOW (ref 34.5–45)
HGB BLD-MCNC: 10.2 G/DL — LOW (ref 11.5–15.5)
HGB BLD-MCNC: 10.2 G/DL — LOW (ref 11.5–15.5)
HYPOCHROMIA BLD QL: SLIGHT — SIGNIFICANT CHANGE UP
HYPOCHROMIA BLD QL: SLIGHT — SIGNIFICANT CHANGE UP
LYMPHOCYTES # BLD AUTO: 1.41 K/UL — SIGNIFICANT CHANGE UP (ref 1–3.3)
LYMPHOCYTES # BLD AUTO: 1.41 K/UL — SIGNIFICANT CHANGE UP (ref 1–3.3)
LYMPHOCYTES # BLD AUTO: 14.9 % — SIGNIFICANT CHANGE UP (ref 13–44)
LYMPHOCYTES # BLD AUTO: 14.9 % — SIGNIFICANT CHANGE UP (ref 13–44)
MACROCYTES BLD QL: SLIGHT — SIGNIFICANT CHANGE UP
MACROCYTES BLD QL: SLIGHT — SIGNIFICANT CHANGE UP
MAGNESIUM SERPL-MCNC: 2 MG/DL — SIGNIFICANT CHANGE UP (ref 1.6–2.6)
MAGNESIUM SERPL-MCNC: 2 MG/DL — SIGNIFICANT CHANGE UP (ref 1.6–2.6)
MANUAL SMEAR VERIFICATION: SIGNIFICANT CHANGE UP
MANUAL SMEAR VERIFICATION: SIGNIFICANT CHANGE UP
MCHC RBC-ENTMCNC: 29.1 PG — SIGNIFICANT CHANGE UP (ref 27–34)
MCHC RBC-ENTMCNC: 29.1 PG — SIGNIFICANT CHANGE UP (ref 27–34)
MCHC RBC-ENTMCNC: 32 GM/DL — SIGNIFICANT CHANGE UP (ref 32–36)
MCHC RBC-ENTMCNC: 32 GM/DL — SIGNIFICANT CHANGE UP (ref 32–36)
MCV RBC AUTO: 91.1 FL — SIGNIFICANT CHANGE UP (ref 80–100)
MCV RBC AUTO: 91.1 FL — SIGNIFICANT CHANGE UP (ref 80–100)
MICROCYTES BLD QL: SLIGHT — SIGNIFICANT CHANGE UP
MICROCYTES BLD QL: SLIGHT — SIGNIFICANT CHANGE UP
MONOCYTES # BLD AUTO: 1.41 K/UL — HIGH (ref 0–0.9)
MONOCYTES # BLD AUTO: 1.41 K/UL — HIGH (ref 0–0.9)
MONOCYTES NFR BLD AUTO: 14.9 % — HIGH (ref 2–14)
MONOCYTES NFR BLD AUTO: 14.9 % — HIGH (ref 2–14)
NEUTROPHILS # BLD AUTO: 6.16 K/UL — SIGNIFICANT CHANGE UP (ref 1.8–7.4)
NEUTROPHILS # BLD AUTO: 6.16 K/UL — SIGNIFICANT CHANGE UP (ref 1.8–7.4)
NEUTROPHILS NFR BLD AUTO: 64.9 % — SIGNIFICANT CHANGE UP (ref 43–77)
NEUTROPHILS NFR BLD AUTO: 64.9 % — SIGNIFICANT CHANGE UP (ref 43–77)
OVALOCYTES BLD QL SMEAR: SLIGHT — SIGNIFICANT CHANGE UP
OVALOCYTES BLD QL SMEAR: SLIGHT — SIGNIFICANT CHANGE UP
PHOSPHATE SERPL-MCNC: 3.9 MG/DL — SIGNIFICANT CHANGE UP (ref 2.5–4.5)
PHOSPHATE SERPL-MCNC: 3.9 MG/DL — SIGNIFICANT CHANGE UP (ref 2.5–4.5)
PLAT MORPH BLD: NORMAL — SIGNIFICANT CHANGE UP
PLAT MORPH BLD: NORMAL — SIGNIFICANT CHANGE UP
PLATELET # BLD AUTO: 581 K/UL — HIGH (ref 150–400)
PLATELET # BLD AUTO: 581 K/UL — HIGH (ref 150–400)
POIKILOCYTOSIS BLD QL AUTO: SLIGHT — SIGNIFICANT CHANGE UP
POIKILOCYTOSIS BLD QL AUTO: SLIGHT — SIGNIFICANT CHANGE UP
POLYCHROMASIA BLD QL SMEAR: SLIGHT — SIGNIFICANT CHANGE UP
POLYCHROMASIA BLD QL SMEAR: SLIGHT — SIGNIFICANT CHANGE UP
POTASSIUM SERPL-MCNC: 4.8 MMOL/L — SIGNIFICANT CHANGE UP (ref 3.5–5.3)
POTASSIUM SERPL-MCNC: 4.8 MMOL/L — SIGNIFICANT CHANGE UP (ref 3.5–5.3)
POTASSIUM SERPL-SCNC: 4.8 MMOL/L — SIGNIFICANT CHANGE UP (ref 3.5–5.3)
POTASSIUM SERPL-SCNC: 4.8 MMOL/L — SIGNIFICANT CHANGE UP (ref 3.5–5.3)
PROT SERPL-MCNC: 7 G/DL — SIGNIFICANT CHANGE UP (ref 6–8.3)
PROT SERPL-MCNC: 7 G/DL — SIGNIFICANT CHANGE UP (ref 6–8.3)
RBC # BLD: 3.5 M/UL — LOW (ref 3.8–5.2)
RBC # BLD: 3.5 M/UL — LOW (ref 3.8–5.2)
RBC # FLD: 14.3 % — SIGNIFICANT CHANGE UP (ref 10.3–14.5)
RBC # FLD: 14.3 % — SIGNIFICANT CHANGE UP (ref 10.3–14.5)
RBC BLD AUTO: ABNORMAL
RBC BLD AUTO: ABNORMAL
RH IG SCN BLD-IMP: POSITIVE — SIGNIFICANT CHANGE UP
RH IG SCN BLD-IMP: POSITIVE — SIGNIFICANT CHANGE UP
SODIUM SERPL-SCNC: 136 MMOL/L — SIGNIFICANT CHANGE UP (ref 135–145)
SODIUM SERPL-SCNC: 136 MMOL/L — SIGNIFICANT CHANGE UP (ref 135–145)
SPHEROCYTES BLD QL SMEAR: SLIGHT — SIGNIFICANT CHANGE UP
SPHEROCYTES BLD QL SMEAR: SLIGHT — SIGNIFICANT CHANGE UP
WBC # BLD: 9.49 K/UL — SIGNIFICANT CHANGE UP (ref 3.8–10.5)
WBC # BLD: 9.49 K/UL — SIGNIFICANT CHANGE UP (ref 3.8–10.5)
WBC # FLD AUTO: 9.49 K/UL — SIGNIFICANT CHANGE UP (ref 3.8–10.5)
WBC # FLD AUTO: 9.49 K/UL — SIGNIFICANT CHANGE UP (ref 3.8–10.5)

## 2023-12-26 PROCEDURE — 83010 ASSAY OF HAPTOGLOBIN QUANT: CPT

## 2023-12-26 PROCEDURE — 99222 1ST HOSP IP/OBS MODERATE 55: CPT

## 2023-12-26 PROCEDURE — 85045 AUTOMATED RETICULOCYTE COUNT: CPT

## 2023-12-26 PROCEDURE — 86850 RBC ANTIBODY SCREEN: CPT

## 2023-12-26 PROCEDURE — 36415 COLL VENOUS BLD VENIPUNCTURE: CPT

## 2023-12-26 PROCEDURE — 85025 COMPLETE CBC W/AUTO DIFF WBC: CPT

## 2023-12-26 PROCEDURE — 83615 LACTATE (LD) (LDH) ENZYME: CPT

## 2023-12-26 PROCEDURE — 86140 C-REACTIVE PROTEIN: CPT

## 2023-12-26 PROCEDURE — 80202 ASSAY OF VANCOMYCIN: CPT

## 2023-12-26 PROCEDURE — 86901 BLOOD TYPING SEROLOGIC RH(D): CPT

## 2023-12-26 PROCEDURE — 99233 SBSQ HOSP IP/OBS HIGH 50: CPT | Mod: GC

## 2023-12-26 PROCEDURE — 82728 ASSAY OF FERRITIN: CPT

## 2023-12-26 PROCEDURE — 96365 THER/PROPH/DIAG IV INF INIT: CPT

## 2023-12-26 PROCEDURE — 73070 X-RAY EXAM OF ELBOW: CPT

## 2023-12-26 PROCEDURE — 73201 CT UPPER EXTREMITY W/DYE: CPT | Mod: 26,LT

## 2023-12-26 PROCEDURE — 84100 ASSAY OF PHOSPHORUS: CPT

## 2023-12-26 PROCEDURE — 87040 BLOOD CULTURE FOR BACTERIA: CPT

## 2023-12-26 PROCEDURE — 83550 IRON BINDING TEST: CPT

## 2023-12-26 PROCEDURE — 73201 CT UPPER EXTREMITY W/DYE: CPT

## 2023-12-26 PROCEDURE — 76705 ECHO EXAM OF ABDOMEN: CPT

## 2023-12-26 PROCEDURE — 86900 BLOOD TYPING SEROLOGIC ABO: CPT

## 2023-12-26 PROCEDURE — 83735 ASSAY OF MAGNESIUM: CPT

## 2023-12-26 PROCEDURE — 99285 EMERGENCY DEPT VISIT HI MDM: CPT

## 2023-12-26 PROCEDURE — 87635 SARS-COV-2 COVID-19 AMP PRB: CPT

## 2023-12-26 PROCEDURE — 83540 ASSAY OF IRON: CPT

## 2023-12-26 PROCEDURE — 85652 RBC SED RATE AUTOMATED: CPT

## 2023-12-26 PROCEDURE — 80053 COMPREHEN METABOLIC PANEL: CPT

## 2023-12-26 PROCEDURE — 80048 BASIC METABOLIC PNL TOTAL CA: CPT

## 2023-12-26 RX ORDER — ACETAMINOPHEN 500 MG
2 TABLET ORAL
Qty: 0 | Refills: 0 | DISCHARGE
Start: 2023-12-26

## 2023-12-26 RX ORDER — PIPERACILLIN AND TAZOBACTAM 4; .5 G/20ML; G/20ML
3.38 INJECTION, POWDER, LYOPHILIZED, FOR SOLUTION INTRAVENOUS EVERY 8 HOURS
Refills: 0 | Status: DISCONTINUED | OUTPATIENT
Start: 2023-12-26 | End: 2023-12-26

## 2023-12-26 RX ORDER — DAPTOMYCIN 500 MG/10ML
350 INJECTION, POWDER, LYOPHILIZED, FOR SOLUTION INTRAVENOUS
Qty: 0 | Refills: 0 | DISCHARGE
Start: 2023-12-26

## 2023-12-26 RX ORDER — PIPERACILLIN AND TAZOBACTAM 4; .5 G/20ML; G/20ML
4.5 INJECTION, POWDER, LYOPHILIZED, FOR SOLUTION INTRAVENOUS EVERY 8 HOURS
Refills: 0 | Status: DISCONTINUED | OUTPATIENT
Start: 2023-12-26 | End: 2023-12-27

## 2023-12-26 RX ORDER — PIPERACILLIN AND TAZOBACTAM 4; .5 G/20ML; G/20ML
3.38 INJECTION, POWDER, LYOPHILIZED, FOR SOLUTION INTRAVENOUS ONCE
Refills: 0 | Status: COMPLETED | OUTPATIENT
Start: 2023-12-26 | End: 2023-12-26

## 2023-12-26 RX ORDER — LANOLIN ALCOHOL/MO/W.PET/CERES
1 CREAM (GRAM) TOPICAL
Qty: 0 | Refills: 0 | DISCHARGE
Start: 2023-12-26

## 2023-12-26 RX ORDER — VANCOMYCIN HCL 1 G
750 VIAL (EA) INTRAVENOUS EVERY 24 HOURS
Refills: 0 | Status: DISCONTINUED | OUTPATIENT
Start: 2023-12-26 | End: 2023-12-26

## 2023-12-26 RX ORDER — DAPTOMYCIN 500 MG/10ML
350 INJECTION, POWDER, LYOPHILIZED, FOR SOLUTION INTRAVENOUS EVERY 24 HOURS
Refills: 0 | Status: DISCONTINUED | OUTPATIENT
Start: 2023-12-26 | End: 2023-12-27

## 2023-12-26 RX ADMIN — PIPERACILLIN AND TAZOBACTAM 25 GRAM(S): 4; .5 INJECTION, POWDER, LYOPHILIZED, FOR SOLUTION INTRAVENOUS at 16:06

## 2023-12-26 RX ADMIN — DAPTOMYCIN 114 MILLIGRAM(S): 500 INJECTION, POWDER, LYOPHILIZED, FOR SOLUTION INTRAVENOUS at 18:47

## 2023-12-26 RX ADMIN — PIPERACILLIN AND TAZOBACTAM 200 GRAM(S): 4; .5 INJECTION, POWDER, LYOPHILIZED, FOR SOLUTION INTRAVENOUS at 11:08

## 2023-12-26 RX ADMIN — PIPERACILLIN AND TAZOBACTAM 25 GRAM(S): 4; .5 INJECTION, POWDER, LYOPHILIZED, FOR SOLUTION INTRAVENOUS at 17:12

## 2023-12-26 RX ADMIN — ENOXAPARIN SODIUM 30 MILLIGRAM(S): 100 INJECTION SUBCUTANEOUS at 17:38

## 2023-12-26 RX ADMIN — LOSARTAN POTASSIUM 25 MILLIGRAM(S): 100 TABLET, FILM COATED ORAL at 06:24

## 2023-12-26 RX ADMIN — Medication 250 MILLIGRAM(S): at 14:46

## 2023-12-26 NOTE — PROGRESS NOTE ADULT - PROBLEM SELECTOR PLAN 8
Home med [id number] five times a day  - unavailable in patient pharmacy, will ask patient family member to bring in Home med [id number] five times a day  - unavailable in patient pharmacy, will ask patient family member to bring in Rule out Osteomyelitis. Patient has left upper extremity, swelling, and erythema. ESR and CRP very high, alk phos elevated  - Concern for OM  - c/w broad spectrum abx  - f/u CT scan  - f/u ortho recs

## 2023-12-26 NOTE — DISCHARGE NOTE PROVIDER - NSDCCPCAREPLAN_GEN_ALL_CORE_FT
PRINCIPAL DISCHARGE DIAGNOSIS  Diagnosis: Postoperative surgical complication involving skin  Assessment and Plan of Treatment: You were found to have cellulitis secondary to a post surgical complication. CT imaging showed a large effusion in your joint and you are being transferred to [facility] to be cared for by the surgeon who performed your ORIF procedure.     PRINCIPAL DISCHARGE DIAGNOSIS  Diagnosis: Postoperative surgical complication involving skin  Assessment and Plan of Treatment: You were found to have cellulitis secondary to a post surgical complication. CT imaging showed a large effusion in your joint and you are being transferred to [facility] to be cared for by the surgeon who performed your ORIF procedure.
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
## 2023-12-26 NOTE — PROGRESS NOTE ADULT - PROBLEM SELECTOR PLAN 7
Homed med losartan 25 qD  - continue with home med  - continue with pain management Home med [id number] five times a day  - unavailable in patient pharmacy, will ask patient family member to bring in Home med [id number] five times a day  - unavailable in patient pharmacy, will ask patient family member to bring in

## 2023-12-26 NOTE — PROGRESS NOTE ADULT - PROBLEM SELECTOR PLAN 1
Cellulitis of the left upper extremitiy with drainange. No abscess, no streaking.   s/p vancomycin 1000mg x1  - vancomycin 750mg q24 - trough Dec 27th  - ceftriaxone 1000mg q12 hours  - pain: tylenol q6 PRN  - f/u Bcx - NG@1 day #r/o OM  Cellulitis of the left upper extremitiy with drainange. No abscess, no streaking. Recent surgery at [facility] with Dr. Bedoya.  , .2  s/p vancomycin 1000mg x1  - vancomycin 750mg q24 - trough Dec 27th  - ceftriaxone dc switched to Zosyn 3.375mg  - pain: tylenol q6 PRN  - f/u Bcx - NG@1 day  - ortho consulted, f/u recs  - no surgical interventions  - f/u CT #r/o OM  Cellulitis of the left upper extremitiy with drainange. No abscess, no streaking. Recent surgery at [facility] with Dr. Bedoya.  , .2  s/p vancomycin 1000mg x1  - vancomycin 750mg q24 - trough Dec 27th  - dc CTX switched to Zosyn 3.375mg  - pain: tylenol q6 PRN  - f/u Bcx - NG@1 day  - ortho consulted - no surgical interventions  - f/u CT elbow
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
## 2023-12-26 NOTE — DISCHARGE NOTE PROVIDER - NSDCMRMEDTOKEN_GEN_ALL_CORE_FT
losartan 25 mg oral tablet: 1 tab(s) orally once a day  Parnate 10 mg oral tablet: 1 tab(s) orally 5 times a day   acetaminophen 325 mg oral tablet: 2 tab(s) orally every 6 hours As needed Temp greater or equal to 38C (100.4F), Mild Pain (1 - 3)  DAPTOmycin 500 mg intravenous injection: 350 milligram(s) intravenous every 24 hours  losartan 25 mg oral tablet: 1 tab(s) orally once a day  melatonin 3 mg oral tablet: 1 tab(s) orally once a day (at bedtime) As needed Insomnia  Parnate 10 mg oral tablet: 1 tab(s) orally 5 times a day

## 2023-12-26 NOTE — PROGRESS NOTE ADULT - PROBLEM SELECTOR PLAN 4
Hgb on admission , MCV normocytic. Currently no signs of active bleeding (no hematochezia, melena, hemoptysis, hematuria)  - trend CBC  - maintain active T&S  - transfuse if Hgb <7 Elevated liver enzymes  - RUQ U/S - mild hepatic steatosis

## 2023-12-26 NOTE — PROGRESS NOTE ADULT - PROBLEM SELECTOR PLAN 2
PE concerning for Septic arthritis. Recent surgery at [facility] with Dr. Bedoya.  , .2  - ortho consulted, f/u recs  - no surgical interventions  - f/u CT PE concerning for Septic arthritis. Recent surgery at [facility] with Dr. Bedoya.  , .2  - ortho consulted, f/u recs  - no surgical interventions  - f/u CT Covid positive 12/20/23. Received paxlovid. Exam benign, on RA  - supportive measures

## 2023-12-26 NOTE — PROGRESS NOTE ADULT - PROBLEM SELECTOR PLAN 3
Covid positive 12/20/23. Received paxlovid. Exam benign, on RA  - supportive measures Hgb on admission , MCV normocytic. Currently no signs of active bleeding (no hematochezia, melena, hemoptysis, hematuria)  - trend CBC  - maintain active T&S  - transfuse if Hgb <7

## 2023-12-26 NOTE — PROGRESS NOTE ADULT - PROBLEM SELECTOR PLAN 10
Plan:  F: none  E: replete K<4, Mg<2  N: dash  VTE Prophylaxis: lovenox  GI: none  C: Full Code  D: [facility] Plan:  F: none  E: replete K<4, Mg<2  N: dash  VTE Prophylaxis: lovenox  GI: none  C: Full Code  D: [facility]

## 2023-12-26 NOTE — PROGRESS NOTE ADULT - PROBLEM SELECTOR PLAN 5
Elevated liver enzymes  - RUQ U/S - mild hepatic steatosis Na at 131. Possibly iso of SIADH 2/2 pain  - Monitor

## 2023-12-26 NOTE — PROGRESS NOTE ADULT - REASON FOR ADMISSION
Left Elbow Swelling/Warmth
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
## 2023-12-26 NOTE — PROGRESS NOTE ADULT - ATTENDING COMMENTS
Patient evaluated with  at the bedside. Hospital course reviewed. Patient presenting with left elbow swelling after recent surgery, reports increased pain and redness since discharge post op, no fevers, no chills. Denies other complaints.    General: AOX3, NAD, lying in bed, speaking in full sentences, no labored breathing on RA  HEENT: AT/NC, no facial asymmetry  Lungs: poor inspiration, no crackles  Abdomen: soft, non-tender  Extremities: left elbow erythema, mild edema, no crepitus, limited ROM. LE warm, no edema, no tenderness, no calf tenderness, no focal deficit     Labs, imaging reviewed      Get CT to better evaluate for collection or deep infection, will broaden ATB coverage to Pip/Tazo and Vanc. ID consult. Trend ESR/CRP, follow-up Bcx  Ortho contacted for follow-up  Patient asymptomatic from Covid, continue on monitoring  DVT ppx Patient evaluated with  at the bedside. Hospital course reviewed. Patient presenting with left elbow swelling after recent surgery, reports increased pain and redness since discharge post op, no fevers, no chills. Denies other complaints.    General: AOX3, NAD, lying in bed, speaking in full sentences, no labored breathing on RA  HEENT: AT/NC, no facial asymmetry  Lungs: poor inspiration, no crackles  Abdomen: soft, non-tender  Extremities: left elbow erythema, mild edema, no crepitus, limited ROM. LE warm, no edema, no tenderness, no calf tenderness, no focal deficit     Labs, imaging reviewed      Get CT to better evaluate for collection or deep infection, will broaden ATB coverage to Pip/Tazo and Vanc. ID consult. Trend ESR/CRP, follow-up Bcx  Ortho contacted for follow-up  Patient asymptomatic from Covid, continue on monitoring  DVT ppx    Addendum: Discussed with patient's Orthopedist at [facility], he is requesting transfer to [facility] Patient evaluated with  at the bedside. Hospital course reviewed. Patient presenting with left elbow swelling after recent surgery, reports increased pain and redness since discharge post op, no fevers, no chills. Denies other complaints.    General: AOX3, NAD, lying in bed, speaking in full sentences, no labored breathing on RA  HEENT: AT/NC, no facial asymmetry  Lungs: poor inspiration, no crackles  Abdomen: soft, non-tender  Extremities: left elbow erythema, mild edema, no crepitus, limited ROM. LE warm, no edema, no tenderness, no calf tenderness, no focal deficit     Labs, imaging reviewed      Get CT to better evaluate for collection or deep infection, will broaden ATB coverage to Pip/Tazo and Vanc. ID consult. Trend ESR/CRP, follow-up Bcx  Ortho contacted for follow-up  Patient asymptomatic from Covid, continue on monitoring  DVT ppx    Addendum: Discussed with patient's Orthopedist at [facility], he is requesting transfer to [facility]

## 2023-12-26 NOTE — DISCHARGE NOTE NURSING/CASE MANAGEMENT/SOCIAL WORK - PATIENT PORTAL LINK FT
You can access the FollowMyHealth Patient Portal offered by [facility] by registering at the following website: [email or web address]. By joining [employer]’s FollowMyHealth portal, you will also be able to view your health information using other applications (apps) compatible with our system. You can access the FollowMyHealth Patient Portal offered by [facility] by registering at the following website: [email or web address]. By joining [employer]’s FollowMyHealth portal, you will also be able to view your health information using other applications (apps) compatible with our system.

## 2023-12-26 NOTE — DISCHARGE NOTE NURSING/CASE MANAGEMENT/SOCIAL WORK - NSTRANSFERBELONGINGSRESP_GEN_A_NUR
Let pt know that white blood cell counts are normal but her blood sugars are high (264); if not dx with diabetes, she should follow up with PCP to discuss why sugars are high. yes

## 2023-12-26 NOTE — DISCHARGE NOTE PROVIDER - PROVIDER TOKENS
FREE:[LAST:[Gregg],FIRST:[Juancarlos],PHONE:[phone]],FAX:[(   [phone]    -],ADDRESS:[[address] [place] [address]],ESTABLISHEDPATIENT:[T]] FREE:[LAST:[Gregg],FIRST:[Juancarlos],PHONE:[phone]],FAX:[(   [phone]    -],ADDRESS:[[address] [place] [address]],ESTABLISHEDPATIENT:[T]]

## 2023-12-26 NOTE — DISCHARGE NOTE PROVIDER - CARE PROVIDER_API CALL
Juancarlos Escobedo  [address] [place] [address]  Phone: [phone]  Fax: (   )    -  Established Patient  Follow Up Time:    Juancarlos Escobedo  [address] [place] [address]  Phone: [phone]  Fax: (   )    -  Established Patient  Follow Up Time:
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
## 2023-12-26 NOTE — PROGRESS NOTE ADULT - PROBLEM SELECTOR PLAN 6
Na at 131. Possibly iso of SIADH 2/2 pain  - Monitor Homed med losartan 25 qD  - continue with home med  - continue with pain management

## 2023-12-26 NOTE — DISCHARGE NOTE NURSING/CASE MANAGEMENT/SOCIAL WORK - NSDCPEFALRISK_GEN_ALL_CORE
For information on Fall & Injury Prevention, visit: [email or web address] OR  [email or web address] OR  https://www.cdc.gov/steadi/patient.html For information on Fall & Injury Prevention, visit: [email or web address] OR  [email or web address] OR  https://www.cdc.gov/steadi/patient.html

## 2023-12-26 NOTE — DISCHARGE NOTE PROVIDER - HOSPITAL COURSE
#Discharge: do not delete    Patient is 83 year old female with a past medical history significant for HTN and psoriatic arthritis, left elbow fracture s/p ORIF (Dr. Daly) on 12/7/23 who presented with pain of the left elbow for the past month and admitted for cellulitis.    Hospital course (by problem):   Cellulitis.   ·  Plan: #r/o OM  Cellulitis of the left upper extremitiy with drainange. No abscess, no streaking. Recent surgery at [facility] with Dr. Bedoya.  , .2  s/p vancomycin 1000mg x1  - vancomycin 750mg q24 - trough Dec 27th  - dc CTX switched to Zosyn 3.375mg  - pain: tylenol q6 PRN  - f/u Bcx - NG@1 day  - ortho consulted - no surgical interventions   Large joint effusion is present with mild synovial   enhancement/synovitis. This is nonspecific. Consider aspiration if there   is concern for infection.       Problem/Plan - 2:  ·  Problem: 2019 novel coronavirus disease (COVID-19).   ·  Plan: Covid positive 12/20/23. Received paxlovid. Exam benign, on RA  - supportive measures.     Problem/Plan - 3:  ·  Problem: Anemia.   ·  Plan: Hgb on admission , MCV normocytic. Currently no signs of active bleeding (no hematochezia, melena, hemoptysis, hematuria)  - trend CBC  - maintain active T&S  - transfuse if Hgb <7.     Problem/Plan - 4:  ·  Problem: Elevated liver enzymes.   ·  Plan: Elevated liver enzymes  - RUQ U/S - mild hepatic steatosis.     Problem/Plan - 5:  ·  Problem: Hyponatremia.   ·  Plan: Na at 131. Possibly iso of SIADH 2/2 pain  - Monitor.     Problem/Plan - 6:  ·  Problem: Hypertension.   ·  Plan: Homed med losartan 25 qD  - continue with home med  - continue with pain management.     Problem/Plan - 7:  ·  Problem: Major depression.   ·  Plan: Home med [id number] five times a day  - unavailable in patient pharmacy, will ask patient family member to bring in.     Problem/Plan - 8:  ·  Problem: Osteomyelitis.   ·  Plan: Rule out Osteomyelitis. Patient has left upper extremity, swelling, and erythema. ESR and CRP very high, alk phos elevated  - Concern for OM  - c/w broad spectrum abx  - f/u CT scan  - f/u ortho recs.    Patient was discharged to: (home/[facility]/acute rehab/hospice, etc, and with what services – home health PT/RN? Home O2?)    New medications:   Changes to old medications:  Medications that were stopped:    Items to follow up as outpatient:    Physical exam at the time of discharge:       #Discharge: do not delete    Patient is 83 year old female with a past medical history significant for HTN and psoriatic arthritis, left elbow fracture s/p ORIF (Dr. Daly) on 12/7/23 who presented with pain of the left elbow for the past month and admitted for cellulitis.    Hospital course (by problem):   Cellulitis.   ·  Plan: #r/o OM  Cellulitis of the left upper extremitiy with drainange. No abscess, no streaking. Recent surgery at [facility] with [facility]. Bedoya.  , .2  s/p vancomycin 1000mg x1  - vancomycin 750mg q24 - trough Dec 27th  - dc CTX switched to Zosyn 3.375mg  - pain: tylenol q6 PRN  - f/u Bcx - NG@1 day  - ortho consulted - no surgical interventions   Large joint effusion is present with mild synovial   enhancement/synovitis. This is nonspecific. Consider aspiration if there   is concern for infection.       Problem/Plan - 2:  ·  Problem: 2019 novel coronavirus disease (COVID-19).   ·  Plan: Covid positive 12/20/23. Received paxlovid. Exam benign, on RA  - supportive measures.     Problem/Plan - 3:  ·  Problem: Anemia.   ·  Plan: Hgb on admission , MCV normocytic. Currently no signs of active bleeding (no hematochezia, melena, hemoptysis, hematuria)  - trend CBC  - maintain active T&S  - transfuse if Hgb <7.     Problem/Plan - 4:  ·  Problem: Elevated liver enzymes.   ·  Plan: Elevated liver enzymes  - RUQ U/S - mild hepatic steatosis.     Problem/Plan - 5:  ·  Problem: Hyponatremia.   ·  Plan: Na at 131. Possibly iso of SIADH 2/2 pain  - Monitor.     Problem/Plan - 6:  ·  Problem: Hypertension.   ·  Plan: Homed med losartan 25 qD  - continue with home med  - continue with pain management.     Problem/Plan - 7:  ·  Problem: Major depression.   ·  Plan: Home med [id number] five times a day  - unavailable in patient pharmacy, will ask patient family member to bring in.     Problem/Plan - 8:  ·  Problem: Osteomyelitis.   ·  Plan: Rule out Osteomyelitis. Patient has left upper extremity, swelling, and erythema. ESR and CRP very high, alk phos elevated  - Concern for OM  - c/w broad spectrum abx  - f/u CT scan  - f/u ortho recs.    Patient was discharged to: (home/[facility]/acute rehab/hospice, etc, and with what services – home health PT/RN? Home O2?)    New medications:   Changes to old medications:  Medications that were stopped:    Items to follow up as outpatient:    Physical exam at the time of discharge:       #Discharge: do not delete    Patient is 83 year old female with a past medical history significant for HTN and psoriatic arthritis, left elbow fracture s/p ORIF (Dr. Daly) on 12/7/23 who presented with pain of the left elbow for the past month and admitted for cellulitis.    Hospital course (by problem):     Cellulitis.   Cellulitis of the left upper extremity with drainage. Recent surgery at [facility] with Dr. Bedoya. Elevated , .2. Patient started on antibiotics inpatient with no improvement of erythema and swelling. CT elbow demonstrates large joint effusion with mild synovial enhancement/synovitis. Aspiration should be considered in the setting of potential infection. Patient started on daptomycin and zosyn as per ID    2019 novel coronavirus disease (COVID-19).   Patient tested Covid positive 12/20/23. Received paxlovid. Satting well on RA    Anemia.   Hgb 9.1 on admission, MCV normocytic. Currently no signs of active bleeding (no hematochezia, melena, hemoptysis, hematuria). Follow up outpatient    Elevated liver enzymes.   RUQ U/S - mild hepatic steatosis.    Hyponatremia.   Na at 131. Possibly iso of SIADH 2/2 pain. Resolved following fluids.    Hypertension.   Home medication losartan 25 qD. Continue home medication.    Major depression.   Home med parnate 10mg five times a day. Continue home medication.       Patient was transferred to: [facility]        Physical exam at the time of discharge:  General: NAD, sitting comfortably in bed   HEENT: PERRL/ EOMI, no scleral icterus, MMM  Neck: Supple, no JVD  Respiratory: lungs CTA b/l, no wheezes/crackles, no accessory muscle use  Cardiovascular: Regular rhythm/rate; +S1 +S2, no murmurs  Gastrointestinal: Soft, NTND, normoactive BS, no rebound, no guarding  Genitourinary: no suprapubic tenderness  Extremities: left elbow with surgical scar along dorsal aspect, erythema surrounding the elbow, and extending 2/3s down the distal aspect of the upper extremity with edema, ROM limited secondary to pain and swelling  Neurological: A&Ox3, no gross focal deficits, follows commands  Skin: Normal temperature, warm, dry       #Discharge: do not delete    Patient is 83 year old female with a past medical history significant for HTN and psoriatic arthritis, left elbow fracture s/p ORIF (Dr. Daly) on 12/7/23 who presented with pain of the left elbow for the past month and admitted for cellulitis.    Hospital course (by problem):     Cellulitis.   Cellulitis of the left upper extremity with drainage. Recent surgery at [facility] with Dr. Bedoya. Elevated , .2. Patient started on antibiotics inpatient with no improvement of erythema and swelling. CT elbow demonstrates large joint effusion with mild synovial enhancement/synovitis. Aspiration should be considered in the setting of potential infection. Patient started on daptomycin and zosyn as per ID    2019 novel coronavirus disease (COVID-19).   Patient tested Covid positive 12/20/23. Received paxlovid. Satting well on RA    Anemia.   Hgb 9.1 on admission, MCV normocytic. Currently no signs of active bleeding (no hematochezia, melena, hemoptysis, hematuria). Follow up outpatient    Elevated liver enzymes.   RUQ U/S - mild hepatic steatosis.    Hyponatremia.   Na at 131. Possibly iso of SIADH 2/2 pain. Resolved following fluids.    Hypertension.   Home medication losartan 25 qD. Continue home medication.    Major depression.   Home med parnate 10mg five times a day. Continue home medication.       Patient was transferred to: [facility]        Physical exam at the time of discharge:  General: NAD, sitting comfortably in bed   HEENT: PERRL/ EOMI, no scleral icterus, MMM  Neck: Supple, no JVD  Respiratory: lungs CTA b/l, no wheezes/crackles, no accessory muscle use  Cardiovascular: Regular rhythm/rate; +S1 +S2, no murmurs  Gastrointestinal: Soft, NTND, normoactive BS, no rebound, no guarding  Genitourinary: no suprapubic tenderness  Extremities: left elbow with surgical scar along dorsal aspect, erythema surrounding the elbow, and extending 2/3s down the distal aspect of the upper extremity with edema, ROM limited secondary to pain and swelling  Neurological: A&Ox3, no gross focal deficits, follows commands  Skin: Normal temperature, warm, dry       #Discharge: do not delete    Patient is 83 year old female with a past medical history significant for HTN and psoriatic arthritis, left elbow fracture s/p ORIF (Dr. Daly) on 12/7/23 who presented with pain of the left elbow for the past month and admitted for cellulitis.    Hospital course (by problem):     Cellulitis.   Cellulitis of the left upper extremity with drainage. Recent surgery at [facility] with Dr. Bedoya. Elevated , .2. Patient started on antibiotics inpatient with no improvement of erythema and swelling. CT elbow demonstrates large joint effusion with mild synovial enhancement/synovitis. Aspiration should be considered in the setting of potential infection. Patient started on daptomycin and zosyn as per ID    2019 novel coronavirus disease (COVID-19).   Patient tested Covid positive 12/20/23. Received paxlovid. Satting well on RA    Anemia.   Hgb 9.1 on admission, MCV normocytic. Currently no signs of active bleeding (no hematochezia, melena, hemoptysis, hematuria). Follow up outpatient    Elevated liver enzymes.   RUQ U/S - mild hepatic steatosis.    Hyponatremia.   Na at 131. Possibly iso of SIADH 2/2 pain. Resolved following fluids.    Hypertension.   Home medication losartan 25 qD. Continue home medication.    Major depression.   Home med parnate 10mg five times a day. Continue home medication.       Patient was transferred to: [facility]    Physical exam at the time of discharge:  General: NAD, sitting comfortably in bed   HEENT: PERRL/ EOMI, no scleral icterus, MMM  Neck: Supple, no JVD  Respiratory: lungs CTA b/l, no wheezes/crackles, no accessory muscle use  Cardiovascular: Regular rhythm/rate; +S1 +S2, no murmurs  Gastrointestinal: Soft, NTND, normoactive BS, no rebound, no guarding  Genitourinary: no suprapubic tenderness  Extremities: left elbow with surgical scar along dorsal aspect, erythema surrounding the elbow, and extending 2/3s down the distal aspect of the upper extremity with edema, ROM limited secondary to pain and swelling  Neurological: A&Ox3, no gross focal deficits, follows commands  Skin: Normal temperature, warm, dry       #Discharge: do not delete    Patient is 83 year old female with a past medical history significant for HTN and psoriatic arthritis, left elbow fracture s/p ORIF (Dr. Daly) on 12/7/23 who presented with pain of the left elbow for the past month and admitted for cellulitis.    Hospital course (by problem):     Cellulitis.   Cellulitis of the left upper extremity with drainage. Recent surgery at [facility] with Dr. Bedoya. Elevated , .2. Patient started on antibiotics inpatient with no improvement of erythema and swelling. CT elbow demonstrates large joint effusion with mild synovial enhancement/synovitis. Aspiration should be considered in the setting of potential infection. Patient started on daptomycin and zosyn as per ID    2019 novel coronavirus disease (COVID-19).   Patient tested Covid positive 12/20/23. Received paxlovid. Satting well on RA    Anemia.   Hgb 9.1 on admission, MCV normocytic. Currently no signs of active bleeding (no hematochezia, melena, hemoptysis, hematuria). Follow up outpatient    Elevated liver enzymes.   RUQ U/S - mild hepatic steatosis.    Hyponatremia.   Na at 131. Possibly iso of SIADH 2/2 pain. Resolved following fluids.    Hypertension.   Home medication losartan 25 qD. Continue home medication.    Major depression.   Home med parnate 10mg five times a day. Continue home medication.       Patient was transferred to: [facility]    Physical exam at the time of discharge:  General: NAD, sitting comfortably in bed   HEENT: PERRL/ EOMI, no scleral icterus, MMM  Neck: Supple, no JVD  Respiratory: lungs CTA b/l, no wheezes/crackles, no accessory muscle use  Cardiovascular: Regular rhythm/rate; +S1 +S2, no murmurs  Gastrointestinal: Soft, NTND, normoactive BS, no rebound, no guarding  Genitourinary: no suprapubic tenderness  Extremities: left elbow with surgical scar along dorsal aspect, erythema surrounding the elbow, and extending 2/3s down the distal aspect of the upper extremity with edema, ROM limited secondary to pain and swelling  Neurological: A&Ox3, no gross focal deficits, follows commands  Skin: Normal temperature, warm, dry

## 2023-12-26 NOTE — CONSULT NOTE ADULT - NS ATTEND AMEND GEN_ALL_CORE FT
83F w/ hx HTN, psoriatic arthritis, L elbow fracture s/p ORIF 12/7/23, with post-op course complicated by ongoing serous drainage from incision since OR, and then 2 weeks post-op (~a week PTA) developed increasing redness, swelling, tenderness and limited ROM of L elbow. Started on keflex as outpatient without improvement, and so presented to [facility] ED on 12/23. Also with recent COVID+ (about a week ago, had sore throat/malaise/subjective fevers) s/p 3 days of paxlovid, and these sx have resolved. She was afebrile on admission, WBC normal (but +bands), , .2, L elbow XR with large effusion, and soft tissue swelling. CT L elbow w/ contrast with large effusion with mild synovial enhancement. BCx x2 ngtd. On exam there is improving cellulitis overlying L elbow/olecranon, but significant joint swelling with pain-limited ROM. Recommend diagnostic arthrocentesis to be sent for cell count with differential, glucose, crystal analysis, alpha defensin and gram stain with aerobic/anaerobic cultures. Recommend stop vancomycin, and start daptomycin 350mg IV q24h (7.4 mg/kg) and check CK. Increase zosyn to 4.5g IV q8h EI. Pending possible transfer to S for further workup (arthrocentesis) and treatment. 83F w/ hx HTN, psoriatic arthritis, L elbow fracture s/p ORIF 12/7/23, with post-op course complicated by ongoing serous drainage from incision since OR, and then 2 weeks post-op (~a week PTA) developed increasing redness, swelling, tenderness and limited ROM of L elbow. Started on keflex as outpatient without improvement, and so presented to [facility] ED on 12/23. Also with recent COVID+ (about a week ago, had sore throat/malaise/subjective fevers) s/p 3 days of paxlovid, and these sx have resolved. She was afebrile on admission, WBC normal (but +bands), , .2, L elbow XR with large effusion, and soft tissue swelling. CT L elbow w/ contrast with large effusion with mild synovial enhancement. BCx x2 ngtd. On exam there is improving cellulitis overlying L elbow/olecranon, but significant joint swelling with pain-limited ROM. Recommend diagnostic arthrocentesis to be sent for cell count with differential, glucose, crystal analysis, alpha defensin and gram stain with aerobic/anaerobic cultures. Recommend stop vancomycin, and start daptomycin 350mg IV q24h (7.4 mg/kg) and check CK. Increase zosyn to 4.5g IV q8h EI. Pending possible transfer to S for further workup (arthrocentesis) and treatment.

## 2023-12-26 NOTE — PROGRESS NOTE ADULT - PROBLEM SELECTOR PLAN 9
Rule out Osteomyelitis. Patient has left upper extremity, swelling, and erythema. ESR and CRP very high, alk phos elevated  - Concern for OM  - c/w broad spectrum abx  - f/u CT scan  - f/u ortho recs Plan:  F: none  E: replete K<4, Mg<2  N: dash  VTE Prophylaxis: lovenox  GI: none  C: Full Code  D: [facility] Plan:  F: none  E: replete K<4, Mg<2  N: dash  VTE Prophylaxis: lovenox  GI: none  C: Full Code  D: [facility]

## 2023-12-27 NOTE — CHART NOTE - NSCHARTNOTEFT_GEN_A_CORE
At ~1:35 am (12/27/23) RN paged resident on call stating patient has been restless and getting frustrated about her transportation still not arriving to take her to [facility]. Resident went to speak to patient at ~1:45 am during which she expressed her frustration with her transport getting delayed. She then started saying that she "does not want to live like this anymore". When asked if patient had any thoughts of hurting herself or ending her life, patient said "I'm not sure". Few minutes later, patient reports she does not have any thoughts of hurting or killing herself. Patient denies any history of suicidal attempts. Transport arrived ~2 am, patient was asked again by resident in the presence of 2 other people (from transport) if she has any suicidal ideations and she denied of any SI's. Patient stated she was "feeling very happy now that transport is here". At ~1:35 am (12/27/23) RN paged resident on call stating patient has been restless and getting frustrated about her transportation still not arriving to take her to [facility]. Resident went to speak to patient at ~1:45 am during which she expressed her frustration with her transport getting delayed. She then started saying that she "does not want to live like this anymore". When asked if patient had any thoughts of hurting herself or ending her life, patient said "I'm not sure". Few minutes later, patient reports she does not have any thoughts of hurting or killing herself. Patient denies any history of suicidal attempts. Transport arrived ~2 am, patient was asked again by resident in the presence of 2 other people (from transport) if she has any suicidal ideations and she denied of any SI's. Patient stated she was "feeling very happy now that transport is here".

## 2023-12-28 LAB
CULTURE RESULTS: SIGNIFICANT CHANGE UP
SPECIMEN SOURCE: SIGNIFICANT CHANGE UP

## 2024-01-01 DIAGNOSIS — M25.422 EFFUSION, LEFT ELBOW: ICD-10-CM

## 2024-01-01 DIAGNOSIS — L03.114 CELLULITIS OF LEFT UPPER LIMB: ICD-10-CM

## 2024-01-01 DIAGNOSIS — L40.50 ARTHROPATHIC PSORIASIS, UNSPECIFIED: ICD-10-CM

## 2024-01-01 DIAGNOSIS — M25.522 PAIN IN LEFT ELBOW: ICD-10-CM
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
## 2024-01-01 DIAGNOSIS — T81.43XA INFECTION FOLLOWING A PROCEDURE, ORGAN AND SPACE SURGICAL SITE, INITIAL ENCOUNTER: ICD-10-CM

## 2024-01-01 DIAGNOSIS — K76.0 FATTY (CHANGE OF) LIVER, NOT ELSEWHERE CLASSIFIED: ICD-10-CM

## 2024-01-01 DIAGNOSIS — M65.9 SYNOVITIS AND TENOSYNOVITIS, UNSPECIFIED: ICD-10-CM

## 2024-01-01 DIAGNOSIS — M86.8X2: ICD-10-CM

## 2024-01-01 DIAGNOSIS — U07.1 COVID-19: ICD-10-CM

## 2024-01-01 DIAGNOSIS — I10 ESSENTIAL (PRIMARY) HYPERTENSION: ICD-10-CM

## 2024-01-01 DIAGNOSIS — R45.1 RESTLESSNESS AND AGITATION: ICD-10-CM

## 2024-01-01 DIAGNOSIS — E22.2 SYNDROME OF INAPPROPRIATE SECRETION OF ANTIDIURETIC HORMONE: ICD-10-CM

## 2024-01-01 DIAGNOSIS — F32.9 MAJOR DEPRESSIVE DISORDER, SINGLE EPISODE, UNSPECIFIED: ICD-10-CM

## 2024-01-01 DIAGNOSIS — D50.9 IRON DEFICIENCY ANEMIA, UNSPECIFIED: ICD-10-CM

## 2024-09-11 PROBLEM — I10 ESSENTIAL (PRIMARY) HYPERTENSION: Chronic | Status: ACTIVE | Noted: 2023-12-23

## 2024-09-24 ENCOUNTER — NON-APPOINTMENT (OUTPATIENT)
Age: 84
End: 2024-09-24

## 2024-09-24 ENCOUNTER — APPOINTMENT (OUTPATIENT)
Dept: HEART AND VASCULAR | Facility: CLINIC | Age: 84
End: 2024-09-24
Payer: MEDICARE

## 2024-09-24 VITALS — SYSTOLIC BLOOD PRESSURE: 150 MMHG | DIASTOLIC BLOOD PRESSURE: 80 MMHG

## 2024-09-24 VITALS
BODY MASS INDEX: 18.61 KG/M2 | HEART RATE: 89 BPM | SYSTOLIC BLOOD PRESSURE: 145 MMHG | TEMPERATURE: 98 F | HEIGHT: 63 IN | DIASTOLIC BLOOD PRESSURE: 79 MMHG | WEIGHT: 105.02 LBS | OXYGEN SATURATION: 97 %

## 2024-09-24 DIAGNOSIS — I10 ESSENTIAL (PRIMARY) HYPERTENSION: ICD-10-CM

## 2024-09-24 DIAGNOSIS — I08.0 RHEUMATIC DISORDERS OF BOTH MITRAL AND AORTIC VALVES: ICD-10-CM

## 2024-09-24 PROCEDURE — 99204 OFFICE O/P NEW MOD 45 MIN: CPT

## 2024-09-24 PROCEDURE — 93000 ELECTROCARDIOGRAM COMPLETE: CPT

## 2024-09-24 RX ORDER — METOPROLOL SUCCINATE 25 MG/1
25 TABLET, EXTENDED RELEASE ORAL DAILY
Qty: 90 | Refills: 1 | Status: ACTIVE | COMMUNITY
Start: 2024-09-24 | End: 1900-01-01

## 2024-09-24 NOTE — ASSESSMENT
[FreeTextEntry1] : CAD-   Recently went for a CCTA and could not have it done since HR was too fast. Will add Toprol XL 25 mg for upcoming test.  AI- moderate to severe in 2021, will update echo.  Hyperdynamic circulation not appreciated  MR- mild to moderate in 2021, will update echo  HTN- Will add Toprol XL 25 mg.  No interactions with Parnate

## 2024-09-24 NOTE — HISTORY OF PRESENT ILLNESS
[FreeTextEntry1] : 85 y/o F with HTN, former smoker (1 PPD x12 yrs), HTN,MR and AI.  Recently went for a CCTA and could not have it done since HR was too fast. She denies CP or SOB.  She is limited by arthritis.  EKG: NSR @ 85, normal axis and intervals, no ST-Tw abnormalities.   ECHO 7/8/21 reveals mild to moderate moderate mitral regurgitation and moderate to severe aortic regurgitation

## 2024-09-24 NOTE — REASON FOR VISIT
[FreeTextEntry1] : 83 y/o F with HTN, former smoker (1 PPD x12 yrs), HTN,MR and AI.  Recently went for a CCTA and could not have it done since HR was too fast.     PCP- Dr Jez Morrison

## 2024-09-24 NOTE — REVIEW OF SYSTEMS
[Joint Pain] : joint pain [Joint Stiffness] : joint stiffness [Rash] : rash [Skin Lesions] : skin lesion(s): [Negative] : Heme/Lymph

## 2024-09-24 NOTE — PHYSICAL EXAM
[Well Developed] : well developed [Well Nourished] : well nourished [No Acute Distress] : no acute distress [Normal Conjunctiva] : normal conjunctiva [Normal Venous Pressure] : normal venous pressure [No Carotid Bruit] : no carotid bruit [Normal S1, S2] : normal S1, S2 [No Rub] : no rub [No Gallop] : no gallop [Clear Lung Fields] : clear lung fields [Good Air Entry] : good air entry [No Respiratory Distress] : no respiratory distress  [Soft] : abdomen soft [Non Tender] : non-tender [No Masses/organomegaly] : no masses/organomegaly [Normal Bowel Sounds] : normal bowel sounds [Normal Gait] : normal gait [No Edema] : no edema [No Cyanosis] : no cyanosis [No Clubbing] : no clubbing [No Varicosities] : no varicosities [Moves all extremities] : moves all extremities [No Focal Deficits] : no focal deficits [Normal Speech] : normal speech [Alert and Oriented] : alert and oriented [Normal memory] : normal memory [Diminished Pedal Pulses ___] : diminished pedal pulses [unfilled] [Rash] : rash [de-identified] : CHAYO, hyperdynamic circulation not appreciated

## 2024-10-02 ENCOUNTER — EMERGENCY (EMERGENCY)
Facility: HOSPITAL | Age: 84
LOS: 1 days | Discharge: HOME | End: 2024-10-02
Attending: STUDENT IN AN ORGANIZED HEALTH CARE EDUCATION/TRAINING PROGRAM | Admitting: STUDENT IN AN ORGANIZED HEALTH CARE EDUCATION/TRAINING PROGRAM
Payer: OTHER MISCELLANEOUS

## 2024-10-02 VITALS
WEIGHT: 106.92 LBS | OXYGEN SATURATION: 100 % | RESPIRATION RATE: 18 BRPM | DIASTOLIC BLOOD PRESSURE: 82 MMHG | TEMPERATURE: 97 F | SYSTOLIC BLOOD PRESSURE: 168 MMHG | HEART RATE: 65 BPM

## 2024-10-02 VITALS
TEMPERATURE: 98 F | DIASTOLIC BLOOD PRESSURE: 66 MMHG | OXYGEN SATURATION: 98 % | RESPIRATION RATE: 18 BRPM | HEART RATE: 63 BPM | SYSTOLIC BLOOD PRESSURE: 149 MMHG

## 2024-10-02 DIAGNOSIS — S22.43XA MULTIPLE FRACTURES OF RIBS, BILATERAL, INITIAL ENCOUNTER FOR CLOSED FRACTURE: ICD-10-CM
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
## 2024-10-02 DIAGNOSIS — S42.402A UNSPECIFIED FRACTURE OF LOWER END OF LEFT HUMERUS, INITIAL ENCOUNTER FOR CLOSED FRACTURE: Chronic | ICD-10-CM

## 2024-10-02 DIAGNOSIS — S09.8XXA OTHER SPECIFIED INJURIES OF HEAD, INITIAL ENCOUNTER: ICD-10-CM
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
## 2024-10-02 DIAGNOSIS — M54.89 OTHER DORSALGIA: ICD-10-CM

## 2024-10-02 PROCEDURE — 70450 CT HEAD/BRAIN W/O DYE: CPT | Mod: MC

## 2024-10-02 PROCEDURE — 99284 EMERGENCY DEPT VISIT MOD MDM: CPT | Mod: 25

## 2024-10-02 PROCEDURE — 71250 CT THORAX DX C-: CPT | Mod: MC

## 2024-10-02 PROCEDURE — 71250 CT THORAX DX C-: CPT | Mod: 26,MC

## 2024-10-02 PROCEDURE — 70450 CT HEAD/BRAIN W/O DYE: CPT | Mod: 26,MC

## 2024-10-02 PROCEDURE — 96372 THER/PROPH/DIAG INJ SC/IM: CPT

## 2024-10-02 PROCEDURE — 99285 EMERGENCY DEPT VISIT HI MDM: CPT

## 2024-10-02 RX ORDER — OXYCODONE HYDROCHLORIDE AND ACETAMINOPHEN 10; 325 MG/1; MG/1
1 TABLET ORAL ONCE
Refills: 0 | Status: DISCONTINUED | OUTPATIENT
Start: 2024-10-02 | End: 2024-10-02

## 2024-10-02 RX ORDER — KETOROLAC TROMETHAMINE 30 MG/ML
15 INJECTION, SOLUTION INTRAMUSCULAR; INTRAVENOUS ONCE
Refills: 0 | Status: DISCONTINUED | OUTPATIENT
Start: 2024-10-02 | End: 2024-10-02

## 2024-10-02 RX ORDER — ACETAMINOPHEN 500 MG/5ML
650 LIQUID (ML) ORAL ONCE
Refills: 0 | Status: COMPLETED | OUTPATIENT
Start: 2024-10-02 | End: 2024-10-02

## 2024-10-02 RX ORDER — OXYCODONE HYDROCHLORIDE AND ACETAMINOPHEN 10; 325 MG/1; MG/1
1 TABLET ORAL
Qty: 15 | Refills: 0
Start: 2024-10-02 | End: 2024-10-04

## 2024-10-02 RX ADMIN — Medication 650 MILLIGRAM(S): at 19:24

## 2024-10-02 RX ADMIN — KETOROLAC TROMETHAMINE 15 MILLIGRAM(S): 30 INJECTION, SOLUTION INTRAMUSCULAR; INTRAVENOUS at 19:24

## 2024-10-02 RX ADMIN — OXYCODONE HYDROCHLORIDE AND ACETAMINOPHEN 1 TABLET(S): 10; 325 TABLET ORAL at 21:04

## 2024-10-03 ENCOUNTER — NON-APPOINTMENT (OUTPATIENT)
Age: 84
End: 2024-10-03

## 2024-10-08 RX ORDER — MELATONIN 3 MG
3 CAPSULE ORAL
Refills: 0 | Status: ACTIVE | COMMUNITY

## 2024-10-08 RX ORDER — DAPTOMYCIN 350 MG/7ML
350 INJECTION, POWDER, LYOPHILIZED, FOR SOLUTION INTRAVENOUS DAILY
Refills: 0 | Status: ACTIVE | COMMUNITY

## 2024-10-08 RX ORDER — ACETAMINOPHEN 325 MG/1
325 TABLET ORAL EVERY 6 HOURS
Qty: 56 | Refills: 0 | Status: ACTIVE | COMMUNITY

## 2024-10-10 ENCOUNTER — NON-APPOINTMENT (OUTPATIENT)
Age: 84
End: 2024-10-10

## 2024-10-10 ENCOUNTER — OUTPATIENT (OUTPATIENT)
Dept: OUTPATIENT SERVICES | Facility: HOSPITAL | Age: 84
LOS: 1 days | End: 2024-10-10
Payer: OTHER MISCELLANEOUS

## 2024-10-10 ENCOUNTER — APPOINTMENT (OUTPATIENT)
Dept: THORACIC SURGERY | Facility: CLINIC | Age: 84
End: 2024-10-10
Payer: MEDICARE

## 2024-10-10 VITALS
OXYGEN SATURATION: 99 % | TEMPERATURE: 97.2 F | HEART RATE: 76 BPM | SYSTOLIC BLOOD PRESSURE: 167 MMHG | WEIGHT: 105 LBS | DIASTOLIC BLOOD PRESSURE: 73 MMHG | HEIGHT: 62 IN | BODY MASS INDEX: 19.32 KG/M2

## 2024-10-10 DIAGNOSIS — S42.402A UNSPECIFIED FRACTURE OF LOWER END OF LEFT HUMERUS, INITIAL ENCOUNTER FOR CLOSED FRACTURE: Chronic | ICD-10-CM

## 2024-10-10 DIAGNOSIS — S22.49XA MULTIPLE FRACTURES OF RIBS, UNSPECIFIED SIDE, INITIAL ENCOUNTER FOR CLOSED FRACTURE: ICD-10-CM

## 2024-10-10 PROCEDURE — 71046 X-RAY EXAM CHEST 2 VIEWS: CPT

## 2024-10-10 PROCEDURE — 71046 X-RAY EXAM CHEST 2 VIEWS: CPT | Mod: 26

## 2024-10-10 PROCEDURE — 99203 OFFICE O/P NEW LOW 30 MIN: CPT

## 2024-10-11 PROBLEM — S22.49XA RIB FRACTURES: Status: ACTIVE | Noted: 2024-10-08

## 2024-11-04 ENCOUNTER — NON-APPOINTMENT (OUTPATIENT)
Age: 84
End: 2024-11-04

## 2024-11-12 ENCOUNTER — NON-APPOINTMENT (OUTPATIENT)
Age: 84
End: 2024-11-12

## 2024-11-12 ENCOUNTER — OUTPATIENT (OUTPATIENT)
Dept: OUTPATIENT SERVICES | Facility: HOSPITAL | Age: 84
LOS: 1 days | End: 2024-11-12

## 2024-11-12 ENCOUNTER — APPOINTMENT (OUTPATIENT)
Dept: PLASTIC SURGERY | Facility: CLINIC | Age: 84
End: 2024-11-12

## 2024-11-12 VITALS — SYSTOLIC BLOOD PRESSURE: 134 MMHG | HEART RATE: 93 BPM | DIASTOLIC BLOOD PRESSURE: 78 MMHG | RESPIRATION RATE: 14 BRPM

## 2024-11-12 DIAGNOSIS — S42.402A UNSPECIFIED FRACTURE OF LOWER END OF LEFT HUMERUS, INITIAL ENCOUNTER FOR CLOSED FRACTURE: Chronic | ICD-10-CM

## 2024-11-12 DIAGNOSIS — Z01.89 ENCOUNTER FOR OTHER SPECIFIED SPECIAL EXAMINATIONS: ICD-10-CM

## 2024-11-14 ENCOUNTER — OUTPATIENT (OUTPATIENT)
Dept: OUTPATIENT SERVICES | Facility: HOSPITAL | Age: 84
LOS: 1 days | End: 2024-11-14
Payer: OTHER MISCELLANEOUS

## 2024-11-14 ENCOUNTER — APPOINTMENT (OUTPATIENT)
Dept: THORACIC SURGERY | Facility: CLINIC | Age: 84
End: 2024-11-14
Payer: MEDICARE

## 2024-11-14 VITALS
WEIGHT: 107 LBS | DIASTOLIC BLOOD PRESSURE: 65 MMHG | HEIGHT: 63 IN | HEART RATE: 85 BPM | TEMPERATURE: 96.7 F | OXYGEN SATURATION: 98 % | SYSTOLIC BLOOD PRESSURE: 140 MMHG | BODY MASS INDEX: 18.96 KG/M2

## 2024-11-14 DIAGNOSIS — S42.402A UNSPECIFIED FRACTURE OF LOWER END OF LEFT HUMERUS, INITIAL ENCOUNTER FOR CLOSED FRACTURE: Chronic | ICD-10-CM

## 2024-11-14 DIAGNOSIS — S22.49XA MULTIPLE FRACTURES OF RIBS, UNSPECIFIED SIDE, INITIAL ENCOUNTER FOR CLOSED FRACTURE: ICD-10-CM

## 2024-11-14 PROCEDURE — 71046 X-RAY EXAM CHEST 2 VIEWS: CPT | Mod: 26

## 2024-11-14 PROCEDURE — 99213 OFFICE O/P EST LOW 20 MIN: CPT

## 2024-11-14 PROCEDURE — 71046 X-RAY EXAM CHEST 2 VIEWS: CPT

## 2024-11-26 ENCOUNTER — OUTPATIENT (OUTPATIENT)
Dept: OUTPATIENT SERVICES | Facility: HOSPITAL | Age: 84
LOS: 1 days | End: 2024-11-26

## 2024-11-26 ENCOUNTER — APPOINTMENT (OUTPATIENT)
Dept: PLASTIC SURGERY | Facility: CLINIC | Age: 84
End: 2024-11-26

## 2024-11-26 DIAGNOSIS — S42.402A UNSPECIFIED FRACTURE OF LOWER END OF LEFT HUMERUS, INITIAL ENCOUNTER FOR CLOSED FRACTURE: Chronic | ICD-10-CM

## 2024-11-27 DIAGNOSIS — Z48.812 ENCOUNTER FOR SURGICAL AFTERCARE FOLLOWING SURGERY ON THE CIRCULATORY SYSTEM: ICD-10-CM

## 2024-12-08 ENCOUNTER — EMERGENCY (EMERGENCY)
Facility: HOSPITAL | Age: 84
LOS: 1 days | Discharge: HOME | End: 2024-12-08
Attending: EMERGENCY MEDICINE | Admitting: EMERGENCY MEDICINE
Payer: MEDICARE

## 2024-12-08 VITALS
SYSTOLIC BLOOD PRESSURE: 156 MMHG | HEIGHT: 62 IN | DIASTOLIC BLOOD PRESSURE: 79 MMHG | OXYGEN SATURATION: 98 % | HEART RATE: 71 BPM | TEMPERATURE: 98 F | WEIGHT: 104.94 LBS | RESPIRATION RATE: 18 BRPM

## 2024-12-08 DIAGNOSIS — S42.402A UNSPECIFIED FRACTURE OF LOWER END OF LEFT HUMERUS, INITIAL ENCOUNTER FOR CLOSED FRACTURE: Chronic | ICD-10-CM

## 2024-12-08 LAB
ALBUMIN SERPL ELPH-MCNC: 4.2 G/DL — SIGNIFICANT CHANGE UP (ref 3.3–5)
ALP SERPL-CCNC: 96 U/L — SIGNIFICANT CHANGE UP (ref 40–120)
ALT FLD-CCNC: 41 U/L — SIGNIFICANT CHANGE UP (ref 10–45)
ANION GAP SERPL CALC-SCNC: 7 MMOL/L — SIGNIFICANT CHANGE UP (ref 5–17)
APPEARANCE UR: CLEAR — SIGNIFICANT CHANGE UP
APTT BLD: 31.1 SEC — SIGNIFICANT CHANGE UP (ref 24.5–35.6)
AST SERPL-CCNC: 31 U/L — SIGNIFICANT CHANGE UP (ref 10–40)
BASOPHILS # BLD AUTO: 0.04 K/UL — SIGNIFICANT CHANGE UP (ref 0–0.2)
BASOPHILS NFR BLD AUTO: 0.5 % — SIGNIFICANT CHANGE UP (ref 0–2)
BILIRUB SERPL-MCNC: 0.4 MG/DL — SIGNIFICANT CHANGE UP (ref 0.2–1.2)
BILIRUB UR-MCNC: NEGATIVE — SIGNIFICANT CHANGE UP
BUN SERPL-MCNC: 32 MG/DL — HIGH (ref 7–23)
CALCIUM SERPL-MCNC: 8.9 MG/DL — SIGNIFICANT CHANGE UP (ref 8.4–10.5)
CHLORIDE SERPL-SCNC: 103 MMOL/L — SIGNIFICANT CHANGE UP (ref 96–108)
CO2 SERPL-SCNC: 27 MMOL/L — SIGNIFICANT CHANGE UP (ref 22–31)
COLOR SPEC: YELLOW — SIGNIFICANT CHANGE UP
CREAT SERPL-MCNC: 0.59 MG/DL — SIGNIFICANT CHANGE UP (ref 0.5–1.3)
DIFF PNL FLD: NEGATIVE — SIGNIFICANT CHANGE UP
EGFR: 89 ML/MIN/1.73M2 — SIGNIFICANT CHANGE UP
EOSINOPHIL # BLD AUTO: 0.42 K/UL — SIGNIFICANT CHANGE UP (ref 0–0.5)
EOSINOPHIL NFR BLD AUTO: 5.5 % — SIGNIFICANT CHANGE UP (ref 0–6)
GLUCOSE SERPL-MCNC: 99 MG/DL — SIGNIFICANT CHANGE UP (ref 70–99)
GLUCOSE UR QL: NEGATIVE MG/DL — SIGNIFICANT CHANGE UP
HCT VFR BLD CALC: 36.7 % — SIGNIFICANT CHANGE UP (ref 34.5–45)
HGB BLD-MCNC: 12.3 G/DL — SIGNIFICANT CHANGE UP (ref 11.5–15.5)
IMM GRANULOCYTES NFR BLD AUTO: 0.4 % — SIGNIFICANT CHANGE UP (ref 0–0.9)
INR BLD: 1 — SIGNIFICANT CHANGE UP (ref 0.85–1.16)
KETONES UR-MCNC: NEGATIVE MG/DL — SIGNIFICANT CHANGE UP
LEUKOCYTE ESTERASE UR-ACNC: ABNORMAL
LYMPHOCYTES # BLD AUTO: 2.43 K/UL — SIGNIFICANT CHANGE UP (ref 1–3.3)
LYMPHOCYTES # BLD AUTO: 32 % — SIGNIFICANT CHANGE UP (ref 13–44)
MCHC RBC-ENTMCNC: 31.1 PG — SIGNIFICANT CHANGE UP (ref 27–34)
MCHC RBC-ENTMCNC: 33.5 G/DL — SIGNIFICANT CHANGE UP (ref 32–36)
MCV RBC AUTO: 92.7 FL — SIGNIFICANT CHANGE UP (ref 80–100)
MONOCYTES # BLD AUTO: 0.67 K/UL — SIGNIFICANT CHANGE UP (ref 0–0.9)
MONOCYTES NFR BLD AUTO: 8.8 % — SIGNIFICANT CHANGE UP (ref 2–14)
NEUTROPHILS # BLD AUTO: 4.01 K/UL — SIGNIFICANT CHANGE UP (ref 1.8–7.4)
NEUTROPHILS NFR BLD AUTO: 52.8 % — SIGNIFICANT CHANGE UP (ref 43–77)
NITRITE UR-MCNC: NEGATIVE — SIGNIFICANT CHANGE UP
NRBC # BLD: 0 /100 WBCS — SIGNIFICANT CHANGE UP (ref 0–0)
PH UR: 6 — SIGNIFICANT CHANGE UP (ref 5–8)
PLATELET # BLD AUTO: 206 K/UL — SIGNIFICANT CHANGE UP (ref 150–400)
POTASSIUM SERPL-MCNC: 4.9 MMOL/L — SIGNIFICANT CHANGE UP (ref 3.5–5.3)
POTASSIUM SERPL-SCNC: 4.9 MMOL/L — SIGNIFICANT CHANGE UP (ref 3.5–5.3)
PROT SERPL-MCNC: 6.8 G/DL — SIGNIFICANT CHANGE UP (ref 6–8.3)
PROT UR-MCNC: NEGATIVE MG/DL — SIGNIFICANT CHANGE UP
PROTHROM AB SERPL-ACNC: 11.5 SEC — SIGNIFICANT CHANGE UP (ref 9.9–13.4)
RBC # BLD: 3.96 M/UL — SIGNIFICANT CHANGE UP (ref 3.8–5.2)
RBC # FLD: 13.5 % — SIGNIFICANT CHANGE UP (ref 10.3–14.5)
SODIUM SERPL-SCNC: 137 MMOL/L — SIGNIFICANT CHANGE UP (ref 135–145)
SP GR SPEC: 1.02 — SIGNIFICANT CHANGE UP (ref 1–1.03)
UROBILINOGEN FLD QL: 0.2 MG/DL — SIGNIFICANT CHANGE UP (ref 0.2–1)
WBC # BLD: 7.6 K/UL — SIGNIFICANT CHANGE UP (ref 3.8–10.5)
WBC # FLD AUTO: 7.6 K/UL — SIGNIFICANT CHANGE UP (ref 3.8–10.5)

## 2024-12-08 PROCEDURE — 93010 ELECTROCARDIOGRAM REPORT: CPT

## 2024-12-08 PROCEDURE — 99285 EMERGENCY DEPT VISIT HI MDM: CPT | Mod: 25

## 2024-12-08 PROCEDURE — 81001 URINALYSIS AUTO W/SCOPE: CPT

## 2024-12-08 PROCEDURE — 85379 FIBRIN DEGRADATION QUANT: CPT

## 2024-12-08 PROCEDURE — 72128 CT CHEST SPINE W/O DYE: CPT | Mod: 26,MC

## 2024-12-08 PROCEDURE — 96374 THER/PROPH/DIAG INJ IV PUSH: CPT

## 2024-12-08 PROCEDURE — 85025 COMPLETE CBC W/AUTO DIFF WBC: CPT

## 2024-12-08 PROCEDURE — 80053 COMPREHEN METABOLIC PANEL: CPT

## 2024-12-08 PROCEDURE — 72128 CT CHEST SPINE W/O DYE: CPT | Mod: MC

## 2024-12-08 PROCEDURE — 99285 EMERGENCY DEPT VISIT HI MDM: CPT | Mod: FS

## 2024-12-08 PROCEDURE — 85730 THROMBOPLASTIN TIME PARTIAL: CPT

## 2024-12-08 PROCEDURE — 71275 CT ANGIOGRAPHY CHEST: CPT | Mod: MC

## 2024-12-08 PROCEDURE — 71275 CT ANGIOGRAPHY CHEST: CPT | Mod: 26,MC

## 2024-12-08 PROCEDURE — 36415 COLL VENOUS BLD VENIPUNCTURE: CPT

## 2024-12-08 PROCEDURE — 71045 X-RAY EXAM CHEST 1 VIEW: CPT

## 2024-12-08 PROCEDURE — 87086 URINE CULTURE/COLONY COUNT: CPT

## 2024-12-08 PROCEDURE — 71045 X-RAY EXAM CHEST 1 VIEW: CPT | Mod: 26

## 2024-12-08 PROCEDURE — 93005 ELECTROCARDIOGRAM TRACING: CPT

## 2024-12-08 PROCEDURE — 85610 PROTHROMBIN TIME: CPT

## 2024-12-08 RX ORDER — KETOROLAC TROMETHAMINE 30 MG/ML
15 INJECTION INTRAMUSCULAR; INTRAVENOUS ONCE
Refills: 0 | Status: DISCONTINUED | OUTPATIENT
Start: 2024-12-08 | End: 2024-12-08

## 2024-12-08 RX ORDER — LIDOCAINE 40 MG/G
2 CREAM TOPICAL ONCE
Refills: 0 | Status: COMPLETED | OUTPATIENT
Start: 2024-12-08 | End: 2024-12-08

## 2024-12-08 RX ORDER — ACETAMINOPHEN 500MG 500 MG/1
725 TABLET, COATED ORAL ONCE
Refills: 0 | Status: COMPLETED | OUTPATIENT
Start: 2024-12-08 | End: 2024-12-08

## 2024-12-08 RX ORDER — LIDOCAINE 40 MG/G
1 CREAM TOPICAL
Qty: 10 | Refills: 0
Start: 2024-12-08 | End: 2024-12-17

## 2024-12-08 RX ADMIN — LIDOCAINE 2 PATCH: 40 CREAM TOPICAL at 23:18

## 2024-12-08 RX ADMIN — KETOROLAC TROMETHAMINE 15 MILLIGRAM(S): 30 INJECTION INTRAMUSCULAR; INTRAVENOUS at 23:41

## 2024-12-08 RX ADMIN — LIDOCAINE 2 PATCH: 40 CREAM TOPICAL at 18:31

## 2024-12-08 NOTE — ED PROVIDER NOTE - PROGRESS NOTE DETAILS
angela - received on sign out pending CT imaging and reeval  thus far - ddimer elevated (getting CTA), labs otherwise ok. UA small leuks, 8wbcs but 6 epithelials. suspect contaminated, no urinary sx.     CT T-Spine w/o acute findings.   CTChest "IMPRESSION:  1.  No pulmonary embolism.  2.  Multiple healing/nonacute fractures of the right posterior 8th   through 11th ribs with persistent fracture lucencies, as described above.   Several of these fractures demonstrate increased displacement since   10/2/2024. No acute rib fracture.  3.  Trace right pleural effusion. No pneumothorax or pulmonary contusion."    CT as above. discussed imaging w ct surg - no interventions needed, can f/u outpatient for continued management. pt breathing comfortably, not splinting, sats ok on room air. pain controlled. will dc w supportive care and outpt follow up    All results reviewed with the patient verbally. Discharge plan and return precautions d/w pt who verbalized understanding and agrees with plan. All questions answered. Vitals WNL. Ready for d/c.

## 2024-12-08 NOTE — ED PROVIDER NOTE - NSFOLLOWUPINSTRUCTIONS_ED_ALL_ED_FT
CT imaging today showed that the rib fractures you had on your last CT imaging are still there and are slightly more out of place than previously.   There are no NEW rib fractures.   Take medications as listed below for pain control.     Follow up with THORACIC SURGERY for continued management.     Return for uncontrolled pain, difficulty breathing, pain with breathing, fever, coughing, chest pain or any other concerning symptoms.       ___    PAIN MEDICATIONS -     TYLENOL / MOTRIN -    For symptoms take Motrin and tylenol - THESE MEDICATIONS DO NOT REQUIRE A PRESCRIPTION AND CAN BE PURCHASED IN ANY PHARMACY. Take motrin 600mg every 6 hours as needed, take with food - discontinue use if you notice abdominal pain, blood in stool or black stools. AND / OR... Take tylenol as needed - 650mg every 6 hours as needed, do not exceed 4000mg in 24 hours. You can take one medication or the other. Or, if one medication alone does not seem to be working, you can actually take both tylenol and motrin together and that is acceptable / safe but you should alternate the medications so you are taking something every few hours; consider the following regimen - Take one medication every 3 hours. 7am motrin with breakfast, 10am tylenol, 1pm motirn with lunch, 4pm tylenol, 7pm motrin with dinner, 10pm tylneol before bed.    OXYCODONE -   Take Oxycodone, 1 tab every 6-8 hours as needed for severe pain. Beware that this medication can cause drowsiness. Do not drive a car or operate heavy machinery while taking medication.    FLEXERIL -   Muscle relaxer - Take flexeril 10mg every 8 hours for muscle spasms, take this only as needed for severe symptoms not controlled by the other medications suggested. Of note, this medication can be sedating and may cause drowsiness, do not  or operate machinery while taking this medication. You can also consider taking it only at night to help you sleep - PRESCRIPTIONS SENT ELECTRONICALLY TO YOUR PHARMACY, SEE BELOW FOR DETAILS...    LIDOCAINE PATCH -   Topical medication - Apply patch over the most painful area. Apply for maximum 12 hours in w 24 hour period (wear for 12 hours, remove for 12 hours). These are available over the counter. CT imaging today showed that the rib fractures you had on your last CT imaging are still there and are slightly more out of place than previously.   There are no NEW rib fractures.   Take medications as listed below for pain control.     Follow up with THORACIC SURGERY for continued management.     Return for uncontrolled pain, difficulty breathing, pain with breathing, fever, coughing, chest pain or any other concerning symptoms.       ___    PAIN MEDICATIONS -     TYLENOL / MOTRIN -    For symptoms take Motrin and tylenol - THESE MEDICATIONS DO NOT REQUIRE A PRESCRIPTION AND CAN BE PURCHASED IN ANY PHARMACY. Take motrin 600mg every 6 hours as needed, take with food - discontinue use if you notice abdominal pain, blood in stool or black stools. AND / OR... Take tylenol as needed - 650mg every 6 hours as needed, do not exceed 4000mg in 24 hours. You can take one medication or the other. Or, if one medication alone does not seem to be working, you can actually take both tylenol and motrin together and that is acceptable / safe but you should alternate the medications so you are taking something every few hours; consider the following regimen - Take one medication every 3 hours. 7am motrin with breakfast, 10am tylenol, 1pm motirn with lunch, 4pm tylenol, 7pm motrin with dinner, 10pm tylneol before bed.      LIDOCAINE PATCH -   Topical medication - Apply patch over the most painful area. Apply for maximum 12 hours in w 24 hour period (wear for 12 hours, remove for 12 hours). These are available over the counter.

## 2024-12-08 NOTE — ED ADULT TRIAGE NOTE - CHIEF COMPLAINT QUOTE
"I broke my right sided ribs two months ago and I'm having more pain and spasms." Endorses heavy lifting and has resumed swimming exercise. Denies new fall/trauma/injury. Denies cp, sob. A/ox3. ambulatory with steady gait independently. Unlabored even respirations. NAD.

## 2024-12-08 NOTE — ED PROVIDER NOTE - CLINICAL SUMMARY MEDICAL DECISION MAKING FREE TEXT BOX
84-year-old with multiple prior old rib fractures, now with 2 days of recurrent pain over the right posterior ribs,  high suspicion musculoskeletal pain, consider recurrent fracture  due to resumed activity and lifting,  consider spinal compression fracture,   as pain is pleuritic also consider PE but less likely as no tachycardia or hypoxia,  will check labs including D-dimer and if D-dimer is elevated will do contrast study for PE rule out, plan to CT also to rule out fracture ,   Tylenol and lidocaine patch for pains, dispo pending workup and reevaluation.

## 2024-12-08 NOTE — ED PROVIDER NOTE - PATIENT PORTAL LINK FT
You can access the FollowMyHealth Patient Portal offered by [facility] by registering at the following website: [email or web address]. By joining [employer]’s FollowMyHealth portal, you will also be able to view your health information using other applications (apps) compatible with our system.

## 2024-12-08 NOTE — ED PROVIDER NOTE - MUSCULOSKELETAL, MLM
Spine appears kyphotic, no central spine tenderness/ supple neck , no pain to light palpation of ribs / back ,

## 2024-12-08 NOTE — ED PROVIDER NOTE - OBJECTIVE STATEMENT
83 yo With history of osteoporosis and psoriatic arthritis, patient has a history of multiple rib fractures on her posterior right ribs after a fall in October, patient reports she had pain at this location for a long time but had entirely resolved and she had gotten back to exercise and has been swimming for 3 weeks including yesterday,  has resumed carrying a moderately heavy backpack around daily,  patient reports recurrent posterior rib pain starting yesterday which worsened significantly today and is now pleuritic, feels the pain is at similar location as when she had pain from her rib fractures, no shortness of breath, no chest pain, no dizziness or lightheadedness, no nausea vomiting or diarrhea, no urinary complaints.  No leg pain or leg swelling or calf pain.

## 2024-12-08 NOTE — ED ADULT NURSE REASSESSMENT NOTE - NS ED NURSE REASSESS COMMENT FT1
Received verbal endorsement from RNMandy. Pt awake, AOX4, ambulatory to the bathroom. Denies any complains at present. Pt pending CT result

## 2024-12-08 NOTE — ED ADULT NURSE NOTE - OBJECTIVE STATEMENT
84yoF came to ED c/o R back pain. Pt states that she broke her ribs on the R side about 2 months ago. Pt followed up with a CT surgeon and everything was healing as expected. Pt recently started swimming again and lifting heavier things. Pt states that the past three days she has been having int pain in her R back near where she fractured her ribs. Pt states the pain is constant but gets worse when she is moving in different directions. Pt denies any new trauma or injury, fevers, chest pain, SOB, numbness, tingling, episodes of incontinence. No bruising or deformity/ noted on exam. Pt ambulates with steady gait. Pt states she took alleve with no relief.

## 2024-12-09 VITALS
HEART RATE: 75 BPM | DIASTOLIC BLOOD PRESSURE: 78 MMHG | RESPIRATION RATE: 16 BRPM | OXYGEN SATURATION: 98 % | SYSTOLIC BLOOD PRESSURE: 131 MMHG | TEMPERATURE: 98 F

## 2024-12-10 DIAGNOSIS — M81.0 AGE-RELATED OSTEOPOROSIS WITHOUT CURRENT PATHOLOGICAL FRACTURE: ICD-10-CM

## 2024-12-10 DIAGNOSIS — L40.50 ARTHROPATHIC PSORIASIS, UNSPECIFIED: ICD-10-CM

## 2024-12-10 DIAGNOSIS — R07.81 PLEURODYNIA: ICD-10-CM

## 2024-12-10 LAB
CULTURE RESULTS: SIGNIFICANT CHANGE UP
SPECIMEN SOURCE: SIGNIFICANT CHANGE UP

## 2024-12-12 ENCOUNTER — APPOINTMENT (OUTPATIENT)
Dept: THORACIC SURGERY | Facility: CLINIC | Age: 84
End: 2024-12-12
Payer: MEDICARE

## 2024-12-12 PROCEDURE — 99213 OFFICE O/P EST LOW 20 MIN: CPT

## 2025-01-23 ENCOUNTER — APPOINTMENT (OUTPATIENT)
Dept: THORACIC SURGERY | Facility: CLINIC | Age: 85
End: 2025-01-23

## 2025-02-06 ENCOUNTER — APPOINTMENT (OUTPATIENT)
Dept: THORACIC SURGERY | Facility: CLINIC | Age: 85
End: 2025-02-06
Payer: MEDICARE

## 2025-02-06 PROCEDURE — 99212 OFFICE O/P EST SF 10 MIN: CPT

## 2025-02-11 ENCOUNTER — APPOINTMENT (OUTPATIENT)
Dept: INTERNAL MEDICINE | Facility: CLINIC | Age: 85
End: 2025-02-11
Payer: MEDICARE

## 2025-02-11 ENCOUNTER — NON-APPOINTMENT (OUTPATIENT)
Age: 85
End: 2025-02-11

## 2025-02-11 VITALS
OXYGEN SATURATION: 99 % | SYSTOLIC BLOOD PRESSURE: 155 MMHG | WEIGHT: 108 LBS | TEMPERATURE: 96.69 F | HEIGHT: 63 IN | HEART RATE: 72 BPM | BODY MASS INDEX: 19.14 KG/M2 | DIASTOLIC BLOOD PRESSURE: 86 MMHG

## 2025-02-11 DIAGNOSIS — I10 ESSENTIAL (PRIMARY) HYPERTENSION: ICD-10-CM

## 2025-02-11 DIAGNOSIS — M81.0 AGE-RELATED OSTEOPOROSIS W/OUT CURRENT PATHOLOGICAL FRACTURE: ICD-10-CM

## 2025-02-11 DIAGNOSIS — L40.50 ARTHROPATHIC PSORIASIS, UNSPECIFIED: ICD-10-CM

## 2025-02-11 DIAGNOSIS — S22.49XA MULTIPLE FRACTURES OF RIBS, UNSPECIFIED SIDE, INITIAL ENCOUNTER FOR CLOSED FRACTURE: ICD-10-CM

## 2025-02-11 DIAGNOSIS — M41.9 SCOLIOSIS, UNSPECIFIED: ICD-10-CM

## 2025-02-11 PROCEDURE — 99214 OFFICE O/P EST MOD 30 MIN: CPT

## 2025-02-11 PROCEDURE — G2211 COMPLEX E/M VISIT ADD ON: CPT

## 2025-02-11 PROCEDURE — 36415 COLL VENOUS BLD VENIPUNCTURE: CPT

## 2025-02-14 ENCOUNTER — APPOINTMENT (OUTPATIENT)
Dept: ORTHOPEDIC SURGERY | Facility: CLINIC | Age: 85
End: 2025-02-14
Payer: MEDICARE

## 2025-02-14 DIAGNOSIS — M25.511 PAIN IN RIGHT SHOULDER: ICD-10-CM

## 2025-02-14 PROCEDURE — 99214 OFFICE O/P EST MOD 30 MIN: CPT | Mod: 25

## 2025-02-14 PROCEDURE — 20553 NJX 1/MLT TRIGGER POINTS 3/>: CPT

## 2025-02-17 ENCOUNTER — TRANSCRIPTION ENCOUNTER (OUTPATIENT)
Age: 85
End: 2025-02-17

## 2025-02-19 ENCOUNTER — NON-APPOINTMENT (OUTPATIENT)
Age: 85
End: 2025-02-19

## 2025-02-21 LAB
25(OH)D3 SERPL-MCNC: 50.8 NG/ML
ALBUMIN SERPL ELPH-MCNC: 4.2 G/DL
ALP BLD-CCNC: 82 U/L
ALT SERPL-CCNC: 24 U/L
ANION GAP SERPL CALC-SCNC: 9 MMOL/L
AST SERPL-CCNC: 19 U/L
BILIRUB SERPL-MCNC: 0.4 MG/DL
BUN SERPL-MCNC: 44 MG/DL
CALCIUM SERPL-MCNC: 9.4 MG/DL
CALCIUM SERPL-MCNC: 9.4 MG/DL
CHLORIDE SERPL-SCNC: 98 MMOL/L
CHOLEST SERPL-MCNC: 148 MG/DL
CO2 SERPL-SCNC: 30 MMOL/L
CREAT SERPL-MCNC: 0.64 MG/DL
EGFR: 88 ML/MIN/1.73M2
GLUCOSE SERPL-MCNC: 104 MG/DL
HDLC SERPL-MCNC: 36 MG/DL
LDLC SERPL CALC-MCNC: 94 MG/DL
NONHDLC SERPL-MCNC: 111 MG/DL
PARATHYROID HORMONE INTACT: 21 PG/ML
POTASSIUM SERPL-SCNC: 5.3 MMOL/L
PROT SERPL-MCNC: 6.7 G/DL
SODIUM SERPL-SCNC: 137 MMOL/L
TRIGL SERPL-MCNC: 91 MG/DL
TSH SERPL-ACNC: 1.59 UIU/ML

## 2025-06-10 ENCOUNTER — NON-APPOINTMENT (OUTPATIENT)
Age: 85
End: 2025-06-10

## 2025-06-11 ENCOUNTER — EMERGENCY (EMERGENCY)
Facility: HOSPITAL | Age: 85
LOS: 1 days | End: 2025-06-11
Attending: STUDENT IN AN ORGANIZED HEALTH CARE EDUCATION/TRAINING PROGRAM | Admitting: STUDENT IN AN ORGANIZED HEALTH CARE EDUCATION/TRAINING PROGRAM
Payer: MEDICARE

## 2025-06-11 VITALS
RESPIRATION RATE: 18 BRPM | OXYGEN SATURATION: 99 % | SYSTOLIC BLOOD PRESSURE: 128 MMHG | DIASTOLIC BLOOD PRESSURE: 78 MMHG | TEMPERATURE: 98 F | HEART RATE: 77 BPM

## 2025-06-11 VITALS
HEART RATE: 80 BPM | WEIGHT: 104.94 LBS | DIASTOLIC BLOOD PRESSURE: 74 MMHG | HEIGHT: 62 IN | TEMPERATURE: 98 F | OXYGEN SATURATION: 98 % | SYSTOLIC BLOOD PRESSURE: 129 MMHG | RESPIRATION RATE: 18 BRPM

## 2025-06-11 DIAGNOSIS — L40.50 ARTHROPATHIC PSORIASIS, UNSPECIFIED: ICD-10-CM
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
## 2025-06-11 DIAGNOSIS — S42.402A UNSPECIFIED FRACTURE OF LOWER END OF LEFT HUMERUS, INITIAL ENCOUNTER FOR CLOSED FRACTURE: Chronic | ICD-10-CM

## 2025-06-11 DIAGNOSIS — S61.251A OPEN BITE OF LEFT INDEX FINGER WITHOUT DAMAGE TO NAIL, INITIAL ENCOUNTER: ICD-10-CM
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
## 2025-06-11 LAB
ANION GAP SERPL CALC-SCNC: 10 MMOL/L — SIGNIFICANT CHANGE UP (ref 5–17)
BASOPHILS # BLD AUTO: 0.03 K/UL — SIGNIFICANT CHANGE UP (ref 0–0.2)
BASOPHILS NFR BLD AUTO: 0.5 % — SIGNIFICANT CHANGE UP (ref 0–2)
BUN SERPL-MCNC: 31 MG/DL — HIGH (ref 7–23)
CALCIUM SERPL-MCNC: 9.1 MG/DL — SIGNIFICANT CHANGE UP (ref 8.4–10.5)
CHLORIDE SERPL-SCNC: 102 MMOL/L — SIGNIFICANT CHANGE UP (ref 96–108)
CO2 SERPL-SCNC: 24 MMOL/L — SIGNIFICANT CHANGE UP (ref 22–31)
CREAT SERPL-MCNC: 0.54 MG/DL — SIGNIFICANT CHANGE UP (ref 0.5–1.3)
CRP SERPL-MCNC: 12.8 MG/L — HIGH (ref 0–4)
EGFR: 91 ML/MIN/1.73M2 — SIGNIFICANT CHANGE UP
EGFR: 91 ML/MIN/1.73M2 — SIGNIFICANT CHANGE UP
EOSINOPHIL # BLD AUTO: 0.26 K/UL — SIGNIFICANT CHANGE UP (ref 0–0.5)
EOSINOPHIL NFR BLD AUTO: 4.1 % — SIGNIFICANT CHANGE UP (ref 0–6)
ERYTHROCYTE [SEDIMENTATION RATE] IN BLOOD: 18 MM/HR — SIGNIFICANT CHANGE UP (ref 0–20)
GLUCOSE SERPL-MCNC: 99 MG/DL — SIGNIFICANT CHANGE UP (ref 70–99)
HCT VFR BLD CALC: 33.8 % — LOW (ref 34.5–45)
HGB BLD-MCNC: 11.8 G/DL — SIGNIFICANT CHANGE UP (ref 11.5–15.5)
IMM GRANULOCYTES NFR BLD AUTO: 0.3 % — SIGNIFICANT CHANGE UP (ref 0–0.9)
LYMPHOCYTES # BLD AUTO: 1.92 K/UL — SIGNIFICANT CHANGE UP (ref 1–3.3)
LYMPHOCYTES # BLD AUTO: 30 % — SIGNIFICANT CHANGE UP (ref 13–44)
MCHC RBC-ENTMCNC: 32.7 PG — SIGNIFICANT CHANGE UP (ref 27–34)
MCHC RBC-ENTMCNC: 34.9 G/DL — SIGNIFICANT CHANGE UP (ref 32–36)
MCV RBC AUTO: 93.6 FL — SIGNIFICANT CHANGE UP (ref 80–100)
MONOCYTES # BLD AUTO: 0.49 K/UL — SIGNIFICANT CHANGE UP (ref 0–0.9)
MONOCYTES NFR BLD AUTO: 7.6 % — SIGNIFICANT CHANGE UP (ref 2–14)
NEUTROPHILS # BLD AUTO: 3.69 K/UL — SIGNIFICANT CHANGE UP (ref 1.8–7.4)
NEUTROPHILS NFR BLD AUTO: 57.5 % — SIGNIFICANT CHANGE UP (ref 43–77)
NRBC BLD AUTO-RTO: 0 /100 WBCS — SIGNIFICANT CHANGE UP (ref 0–0)
PLATELET # BLD AUTO: 189 K/UL — SIGNIFICANT CHANGE UP (ref 150–400)
POTASSIUM SERPL-MCNC: 4.1 MMOL/L — SIGNIFICANT CHANGE UP (ref 3.5–5.3)
POTASSIUM SERPL-SCNC: 4.1 MMOL/L — SIGNIFICANT CHANGE UP (ref 3.5–5.3)
RBC # BLD: 3.61 M/UL — LOW (ref 3.8–5.2)
RBC # FLD: 14 % — SIGNIFICANT CHANGE UP (ref 10.3–14.5)
SODIUM SERPL-SCNC: 136 MMOL/L — SIGNIFICANT CHANGE UP (ref 135–145)
WBC # BLD: 6.41 K/UL — SIGNIFICANT CHANGE UP (ref 3.8–10.5)
WBC # FLD AUTO: 6.41 K/UL — SIGNIFICANT CHANGE UP (ref 3.8–10.5)

## 2025-06-11 PROCEDURE — 36415 COLL VENOUS BLD VENIPUNCTURE: CPT

## 2025-06-11 PROCEDURE — 99284 EMERGENCY DEPT VISIT MOD MDM: CPT | Mod: FS

## 2025-06-11 PROCEDURE — 86140 C-REACTIVE PROTEIN: CPT

## 2025-06-11 PROCEDURE — 87040 BLOOD CULTURE FOR BACTERIA: CPT

## 2025-06-11 PROCEDURE — 96374 THER/PROPH/DIAG INJ IV PUSH: CPT

## 2025-06-11 PROCEDURE — 99284 EMERGENCY DEPT VISIT MOD MDM: CPT | Mod: 25

## 2025-06-11 PROCEDURE — 80048 BASIC METABOLIC PNL TOTAL CA: CPT

## 2025-06-11 PROCEDURE — 73130 X-RAY EXAM OF HAND: CPT | Mod: 26,LT

## 2025-06-11 PROCEDURE — 85025 COMPLETE CBC W/AUTO DIFF WBC: CPT

## 2025-06-11 PROCEDURE — 85652 RBC SED RATE AUTOMATED: CPT

## 2025-06-11 PROCEDURE — 73130 X-RAY EXAM OF HAND: CPT

## 2025-06-11 RX ORDER — AMPICILLIN SODIUM AND SULBACTAM SODIUM 1; .5 G/1; G/1
3 INJECTION, POWDER, FOR SOLUTION INTRAMUSCULAR; INTRAVENOUS ONCE
Refills: 0 | Status: COMPLETED | OUTPATIENT
Start: 2025-06-11 | End: 2025-06-11

## 2025-06-11 RX ADMIN — AMPICILLIN SODIUM AND SULBACTAM SODIUM 200 GRAM(S): 1; .5 INJECTION, POWDER, FOR SOLUTION INTRAMUSCULAR; INTRAVENOUS at 16:59

## 2025-06-16 LAB
CULTURE RESULTS: SIGNIFICANT CHANGE UP
CULTURE RESULTS: SIGNIFICANT CHANGE UP
SPECIMEN SOURCE: SIGNIFICANT CHANGE UP
SPECIMEN SOURCE: SIGNIFICANT CHANGE UP

## 2025-07-28 ENCOUNTER — APPOINTMENT (OUTPATIENT)
Dept: HEART AND VASCULAR | Facility: CLINIC | Age: 85
End: 2025-07-28
Payer: MEDICARE

## 2025-07-28 ENCOUNTER — NON-APPOINTMENT (OUTPATIENT)
Age: 85
End: 2025-07-28

## 2025-07-28 VITALS
DIASTOLIC BLOOD PRESSURE: 80 MMHG | OXYGEN SATURATION: 98 % | HEIGHT: 63 IN | TEMPERATURE: 97.9 F | BODY MASS INDEX: 18.07 KG/M2 | HEART RATE: 69 BPM | SYSTOLIC BLOOD PRESSURE: 148 MMHG | WEIGHT: 102 LBS

## 2025-07-28 DIAGNOSIS — I10 ESSENTIAL (PRIMARY) HYPERTENSION: ICD-10-CM

## 2025-07-28 DIAGNOSIS — L40.50 ARTHROPATHIC PSORIASIS, UNSPECIFIED: ICD-10-CM

## 2025-07-28 DIAGNOSIS — I08.0 RHEUMATIC DISORDERS OF BOTH MITRAL AND AORTIC VALVES: ICD-10-CM

## 2025-07-28 PROCEDURE — 93000 ELECTROCARDIOGRAM COMPLETE: CPT

## 2025-07-28 PROCEDURE — 99213 OFFICE O/P EST LOW 20 MIN: CPT

## 2025-07-28 RX ORDER — METHOTREXATE 2.5 MG/1
2.5 TABLET ORAL
Qty: 5 | Refills: 0 | Status: ACTIVE | COMMUNITY
Start: 2025-07-28

## 2025-08-06 RX ORDER — ADALIMUMAB 20MG/0.2ML
20 KIT SUBCUTANEOUS
Qty: 1 | Refills: 0 | Status: DISCONTINUED | COMMUNITY
Start: 2025-07-28 | End: 2025-08-06

## 2025-08-26 ENCOUNTER — APPOINTMENT (OUTPATIENT)
Dept: HEART AND VASCULAR | Facility: CLINIC | Age: 85
End: 2025-08-26
Payer: MEDICARE

## 2025-08-26 ENCOUNTER — APPOINTMENT (OUTPATIENT)
Dept: GERIATRICS | Facility: CLINIC | Age: 85
End: 2025-08-26
Payer: MEDICARE

## 2025-08-26 VITALS
SYSTOLIC BLOOD PRESSURE: 153 MMHG | WEIGHT: 101 LBS | HEART RATE: 73 BPM | HEIGHT: 63 IN | BODY MASS INDEX: 17.89 KG/M2 | DIASTOLIC BLOOD PRESSURE: 77 MMHG | OXYGEN SATURATION: 96 %

## 2025-08-26 DIAGNOSIS — Z80.3 FAMILY HISTORY OF MALIGNANT NEOPLASM OF BREAST: ICD-10-CM

## 2025-08-26 DIAGNOSIS — M41.9 SCOLIOSIS, UNSPECIFIED: ICD-10-CM

## 2025-08-26 DIAGNOSIS — Z01.89 ENCOUNTER FOR OTHER SPECIFIED SPECIAL EXAMINATIONS: ICD-10-CM

## 2025-08-26 DIAGNOSIS — I10 ESSENTIAL (PRIMARY) HYPERTENSION: ICD-10-CM

## 2025-08-26 DIAGNOSIS — L40.50 ARTHROPATHIC PSORIASIS, UNSPECIFIED: ICD-10-CM

## 2025-08-26 DIAGNOSIS — F32.A DEPRESSION, UNSPECIFIED: ICD-10-CM

## 2025-08-26 DIAGNOSIS — I08.0 RHEUMATIC DISORDERS OF BOTH MITRAL AND AORTIC VALVES: ICD-10-CM

## 2025-08-26 DIAGNOSIS — M81.0 AGE-RELATED OSTEOPOROSIS W/OUT CURRENT PATHOLOGICAL FRACTURE: ICD-10-CM

## 2025-08-26 DIAGNOSIS — R00.0 TACHYCARDIA, UNSPECIFIED: ICD-10-CM

## 2025-08-26 PROCEDURE — 93306 TTE W/DOPPLER COMPLETE: CPT

## 2025-08-26 PROCEDURE — 99205 OFFICE O/P NEW HI 60 MIN: CPT

## 2025-08-26 PROCEDURE — 36415 COLL VENOUS BLD VENIPUNCTURE: CPT

## 2025-08-27 LAB
25(OH)D3 SERPL-MCNC: 45.3 NG/ML
ALBUMIN SERPL ELPH-MCNC: 4.2 G/DL
ALP BLD-CCNC: 75 U/L
ALT SERPL-CCNC: 72 U/L
ANION GAP SERPL CALC-SCNC: 12 MMOL/L
AST SERPL-CCNC: 50 U/L
BILIRUB SERPL-MCNC: 0.4 MG/DL
BUN SERPL-MCNC: 36 MG/DL
CALCIUM SERPL-MCNC: 9.1 MG/DL
CHLORIDE SERPL-SCNC: 102 MMOL/L
CO2 SERPL-SCNC: 24 MMOL/L
CREAT SERPL-MCNC: 0.6 MG/DL
EGFRCR SERPLBLD CKD-EPI 2021: 88 ML/MIN/1.73M2
GLUCOSE SERPL-MCNC: 97 MG/DL
HCT VFR BLD CALC: 35.5 %
HGB BLD-MCNC: 11.8 G/DL
MCHC RBC-ENTMCNC: 31.9 PG
MCHC RBC-ENTMCNC: 33.2 G/DL
MCV RBC AUTO: 95.9 FL
PLATELET # BLD AUTO: 187 K/UL
POTASSIUM SERPL-SCNC: 4.4 MMOL/L
PROT SERPL-MCNC: 6.2 G/DL
RBC # BLD: 3.7 M/UL
RBC # FLD: 13.3 %
SODIUM SERPL-SCNC: 138 MMOL/L
TSH SERPL-ACNC: 1.7 UIU/ML
VIT B12 SERPL-MCNC: 1397 PG/ML
WBC # FLD AUTO: 6.54 K/UL